# Patient Record
Sex: MALE | Race: WHITE | NOT HISPANIC OR LATINO | Employment: FULL TIME | URBAN - NONMETROPOLITAN AREA
[De-identification: names, ages, dates, MRNs, and addresses within clinical notes are randomized per-mention and may not be internally consistent; named-entity substitution may affect disease eponyms.]

---

## 2017-02-10 ENCOUNTER — TELEPHONE (OUTPATIENT)
Dept: INTERNAL MEDICINE | Facility: CLINIC | Age: 42
End: 2017-02-10

## 2017-02-10 NOTE — TELEPHONE ENCOUNTER
----- Message from China Alonzo sent at 2/10/2017  2:29 PM EST -----  Contact: Patients Wife   Patients spouse called the office requesting to see if Dr. Schafer could put him in a order to have his labs drawn to check his testosterone levels. She said that he has always seen [facility] and is unable to get off work. He was wanting to est with Dr. Schafer. He had an appointment scheduled for this evening but had to cancel due to not being able to get off work.She said that they have to have their labs drawn at [facility] due to insurance. She is wanting to see if there is any way someone could contact them before the weekend that he is almost out of medication.

## 2017-02-10 NOTE — TELEPHONE ENCOUNTER
I spoke with patients wife and informed her that the patient would need to be seen before labs could be ordered. The patient is scheduled to see mikel on Tuesday.

## 2017-02-14 ENCOUNTER — OFFICE VISIT (OUTPATIENT)
Dept: INTERNAL MEDICINE | Facility: CLINIC | Age: 42
End: 2017-02-14

## 2017-02-14 VITALS
HEART RATE: 87 BPM | HEIGHT: 76 IN | SYSTOLIC BLOOD PRESSURE: 136 MMHG | TEMPERATURE: 98.8 F | WEIGHT: 315 LBS | BODY MASS INDEX: 38.36 KG/M2 | DIASTOLIC BLOOD PRESSURE: 80 MMHG | OXYGEN SATURATION: 100 %

## 2017-02-14 DIAGNOSIS — R79.89 LOW TESTOSTERONE: Primary | ICD-10-CM

## 2017-02-14 DIAGNOSIS — J06.9 ACUTE URI: ICD-10-CM

## 2017-02-14 LAB
EXPIRATION DATE: NORMAL
FLUAV AG NPH QL: NEGATIVE
FLUBV AG NPH QL: NEGATIVE
INTERNAL CONTROL: NORMAL
Lab: NORMAL

## 2017-02-14 PROCEDURE — 99213 OFFICE O/P EST LOW 20 MIN: CPT | Performed by: PHYSICIAN ASSISTANT

## 2017-02-14 PROCEDURE — 87804 INFLUENZA ASSAY W/OPTIC: CPT | Performed by: PHYSICIAN ASSISTANT

## 2017-02-14 RX ORDER — TESTOSTERONE CYPIONATE 200 MG/ML
INJECTION, SOLUTION INTRAMUSCULAR
Qty: 3 ML | Refills: 5
Start: 2017-02-14 | End: 2017-10-16 | Stop reason: SDUPTHER

## 2017-02-14 RX ORDER — TESTOSTERONE CYPIONATE 200 MG/ML
INJECTION, SOLUTION INTRAMUSCULAR
Qty: 3 ML | Refills: 5 | Status: CANCELLED | OUTPATIENT
Start: 2017-02-14

## 2017-02-14 NOTE — PROGRESS NOTES
Cooper Luu is a 42 y.o. male.     Subjective   History of Present Illness   Here today for follow up of hypogonadism. Feels he is doing well with testosterone injections which he administers to himself every 10 days. Energy level has improved with injections. Low libido has improved since regulating testosterone. Denies erectile dysfunction. Denies decrease in urine stream or increase in nocturia. States he is about 7 days past due for his injection so testosterone level may be lower than when previously checked.     Today reports 3 days of cough, sore throat, body aches, headache and dizziness. Feels feverish at times but no reported fever. Has been exposed to the flu at work. Denies fever.        The following portions of the patient's history were reviewed and updated as appropriate: allergies, current medications, past family history, past medical history, past social history, past surgical history and problem list.    Review of Systems    Constitutional: Feeling feverish. Negative for appetite change, chills, fatigue and unexpected weight change.   HENT:  Congestion, postnasal drip, rhinorrhea, sore throat, cough.  Negative for ear pain, hearing loss, nosebleeds, tinnitus and trouble swallowing.    Eyes: Negative for photophobia, discharge and visual disturbance.   Respiratory: Negative for chest tightness, shortness of breath and wheezing.    Cardiovascular: Negative for chest pain, palpitations and leg swelling.   Gastrointestinal: Negative for abdominal distention, abdominal pain, blood in stool, constipation, diarrhea, nausea and vomiting.   Endocrine: Negative for cold intolerance, heat intolerance, polydipsia, polyphagia and polyuria.   Musculoskeletal: Negative for arthralgias, back pain, joint swelling, myalgias, neck pain and neck stiffness.   Skin: Negative for color change, pallor, rash and wound.   Allergic/Immunologic: Negative for environmental allergies, food allergies and immunocompromised  "state.   Neurological: Headaches, dizziness.  Negative for tremors, seizures, weakness, numbness  Hematological: Negative for adenopathy. Does not bruise/bleed easily.   Psychiatric/Behavioral: Negative for agitation, behavioral problems, confusion, hallucinations, self-injury and suicidal ideas. The patient is not nervous/anxious.      Objective    Physical Exam  Constitutional: Oriented to person, place, and time. Appears well-developed and well-nourished.   HENT: OP erythema. Right TM effusion.    Head: No sinus tenderness. Normocephalic and atraumatic.   Eyes: EOM are normal. Pupils are equal, round, and reactive to light.   Neck: Normal range of motion. Neck supple.   Cardiovascular: Normal rate, regular rhythm and normal heart sounds.    Pulmonary/Chest: Effort normal and breath sounds normal. No respiratory distress.  Has no wheezes or rales. Exhibits no chest wall tenderness.   Abdominal: Soft. Bowel sounds are normal. Exhibits no distension and no mass. There is no tenderness.   Musculoskeletal: Normal range of motion. Exhibits no tenderness.   Neurological: Alert and oriented to person, place, and time.   Skin: Skin is warm and dry.   Psychiatric: Has a normal mood and affect. Behavior is normal. Judgment and thought content normal.       Visit Vitals   • /80   • Pulse 87   • Temp 98.8 °F (37.1 °C)   • Ht 76\" (193 cm)   • Wt (!) 358 lb (162 kg)   • SpO2 100%   • BMI 43.58 kg/m2       Nursing note and vitals reviewed.        Assessment/Plan   Cooper was seen today for follow-up.    Diagnoses and all orders for this visit:    Low testosterone  -     Testosterone, Free, Total  -     Vitamin D 25 Hydroxy  -     Comprehensive Metabolic Panel    Acute URI      Influenza: negative A&B  Increase fluid intake. Supportive care. Call with worsened symptoms.       F/u 6 months.        "

## 2017-02-16 RX ORDER — AZITHROMYCIN 250 MG/1
TABLET, FILM COATED ORAL
Qty: 6 TABLET | Refills: 0 | Status: SHIPPED | OUTPATIENT
Start: 2017-02-16 | End: 2017-06-20

## 2017-02-20 RX ORDER — ERGOCALCIFEROL 1.25 MG/1
50000 CAPSULE ORAL WEEKLY
Qty: 4 CAPSULE | Refills: 2 | Status: SHIPPED | OUTPATIENT
Start: 2017-02-20 | End: 2017-03-22

## 2017-06-20 ENCOUNTER — OFFICE VISIT (OUTPATIENT)
Dept: INTERNAL MEDICINE | Facility: CLINIC | Age: 42
End: 2017-06-20

## 2017-06-20 VITALS
SYSTOLIC BLOOD PRESSURE: 130 MMHG | BODY MASS INDEX: 38.36 KG/M2 | HEIGHT: 76 IN | DIASTOLIC BLOOD PRESSURE: 86 MMHG | TEMPERATURE: 98.2 F | HEART RATE: 81 BPM | WEIGHT: 315 LBS | OXYGEN SATURATION: 99 %

## 2017-06-20 DIAGNOSIS — L30.1 DYSHIDROTIC ECZEMA: Primary | ICD-10-CM

## 2017-06-20 PROCEDURE — 99213 OFFICE O/P EST LOW 20 MIN: CPT | Performed by: PHYSICIAN ASSISTANT

## 2017-06-20 RX ORDER — ERGOCALCIFEROL 1.25 MG/1
CAPSULE ORAL
Refills: 0 | COMMUNITY
Start: 2017-04-16 | End: 2017-07-27 | Stop reason: SDUPTHER

## 2017-06-20 NOTE — PROGRESS NOTES
Cooper Luu is a 42 y.o. male.     Subjective   History of Present Illness   Here today with concern of an itchy rash on bilateral hands and the flexural surface of left elbow intermittently for the last year. Symptoms last a few weeks then resolve spontaneously.         The following portions of the patient's history were reviewed and updated as appropriate: allergies, current medications, past family history, past medical history, past social history, past surgical history and problem list.    Review of Systems    Constitutional: Negative for appetite change, chills, fatigue, fever and unexpected weight change.   HENT: Negative for congestion, ear pain, hearing loss, nosebleeds, postnasal drip, rhinorrhea, sore throat, tinnitus and trouble swallowing.    Eyes: Negative for photophobia, discharge and visual disturbance.   Respiratory: Negative for cough, chest tightness, shortness of breath and wheezing.    Cardiovascular: Negative for chest pain, palpitations and leg swelling.   Gastrointestinal: Negative for abdominal distention, abdominal pain, blood in stool, constipation, diarrhea, nausea and vomiting.   Endocrine: Negative for cold intolerance, heat intolerance, polydipsia, polyphagia and polyuria.   Musculoskeletal: Negative for arthralgias, back pain, joint swelling, myalgias, neck pain and neck stiffness.   Skin: pruritic rash bilateral hands. Negative for color change, pallor and wound.   Allergic/Immunologic: Negative for environmental allergies, food allergies and immunocompromised state.   Neurological: Negative for dizziness, tremors, seizures, weakness, numbness and headaches.   Hematological: Negative for adenopathy. Does not bruise/bleed easily.   Psychiatric/Behavioral: Negative for sleep disturbances, agitation, behavioral problems, confusion, hallucinations, self-injury and suicidal ideas. The patient is not nervous/anxious.      Objective    Physical Exam  Constitutional: Oriented to  "person, place, and time. Appears well-developed and well-nourished.   HENT:   Head: Normocephalic and atraumatic.   Eyes: EOM are normal. Pupils are equal, round, and reactive to light.   Neck: Normal range of motion. Neck supple.   Cardiovascular: Normal rate, regular rhythm and normal heart sounds.    Pulmonary/Chest: Effort normal and breath sounds normal. No respiratory distress.  Has no wheezes or rales. Exhibits no chest wall tenderness.   Abdominal: Soft. Bowel sounds are normal. Exhibits no distension and no mass. There is no tenderness.   Musculoskeletal: Normal range of motion. Exhibits no tenderness.   Neurological: Alert and oriented to person, place, and time.   Skin: pinpoint sized vesicles in webbing of fingers bilaterally and flexural surface of left elbow. Lichenification of the base of the right 3rd finger. Skin is warm and dry.   Psychiatric: Has a normal mood and affect. Behavior is normal. Judgment and thought content normal.       /86  Pulse 81  Temp 98.2 °F (36.8 °C)  Ht 76\" (193 cm)  Wt (!) 357 lb (162 kg)  SpO2 99%  BMI 43.46 kg/m2    Nursing note and vitals reviewed.        Assessment/Plan   Diagnoses and all orders for this visit:    Dyshidrotic eczema  -     triamcinolone (KENALOG) 0.1 % ointment; Apply a thin layer to effected areas twice daily during eczema flare ups.  Use for not more than 10 days at a time. Avoid face and groin.    Keep well moisturized between flare ups. Avoid aggressive scratching to prevent lichenification of areas.    F/u if symptoms persist or worsen.            "

## 2017-07-26 ENCOUNTER — TELEPHONE (OUTPATIENT)
Dept: INTERNAL MEDICINE | Facility: CLINIC | Age: 42
End: 2017-07-26

## 2017-07-26 NOTE — TELEPHONE ENCOUNTER
----- Message from Cooper Luu sent at 7/26/2017  8:41 AM EDT -----  Regarding: Referral Request  Contact: [phone]  Good morning. I had to go to the emergency room last night due to extreme pain in my right hip. The emergency room doctor said there is a bone spur on one of my vertebrae that is pressing on a nerve. He recommended that I see a neurosurgeon. Would mind referring me to Dr. Titus Romero ([phone]) please?

## 2017-07-27 ENCOUNTER — OFFICE VISIT (OUTPATIENT)
Dept: INTERNAL MEDICINE | Facility: CLINIC | Age: 42
End: 2017-07-27

## 2017-07-27 VITALS
DIASTOLIC BLOOD PRESSURE: 90 MMHG | HEART RATE: 91 BPM | SYSTOLIC BLOOD PRESSURE: 136 MMHG | WEIGHT: 315 LBS | BODY MASS INDEX: 38.36 KG/M2 | TEMPERATURE: 98.4 F | HEIGHT: 76 IN | OXYGEN SATURATION: 99 %

## 2017-07-27 DIAGNOSIS — M54.16 LUMBAR RADICULOPATHY, ACUTE: ICD-10-CM

## 2017-07-27 DIAGNOSIS — R10.31 SEVERE RIGHT GROIN PAIN: ICD-10-CM

## 2017-07-27 DIAGNOSIS — M48.061 LUMBAR STENOSIS: ICD-10-CM

## 2017-07-27 DIAGNOSIS — M25.551 RIGHT HIP PAIN: Primary | ICD-10-CM

## 2017-07-27 PROCEDURE — 99213 OFFICE O/P EST LOW 20 MIN: CPT | Performed by: PHYSICIAN ASSISTANT

## 2017-07-27 RX ORDER — OXYCODONE AND ACETAMINOPHEN 5; 325 MG/1; MG/1
TABLET ORAL
Refills: 0 | COMMUNITY
Start: 2017-07-26 | End: 2017-08-03

## 2017-07-27 RX ORDER — GABAPENTIN 300 MG/1
CAPSULE ORAL
Refills: 0 | COMMUNITY
Start: 2017-07-26 | End: 2017-08-03

## 2017-07-27 RX ORDER — ERGOCALCIFEROL 1.25 MG/1
50000 CAPSULE ORAL
Qty: 30 CAPSULE | Refills: 0 | Status: SHIPPED | OUTPATIENT
Start: 2017-07-27 | End: 2018-06-01

## 2017-07-27 NOTE — TELEPHONE ENCOUNTER
We cannot refer to a neurosurgeon without an MRI and he will need to be seen for an appointment to discuss first.

## 2017-07-27 NOTE — PROGRESS NOTES
Cooper Luu is a 42 y.o. male.     Subjective   History of Present Illness   Here today for follow up from [facility] ED visit on 7/25/2017 due to acute anterior right hip pain. CT scan showed lumbar canal stenosis. Nephrolithiasis and appendicitis were ruled out. Today he reports continued sharp pain in the anterior right hip and groin. Pain does not radiate. Pain worsens with lifting his leg even a small amount.  Feels weak in the right leg and has chronic numbness and restlessness in the right toes.  Initially pain was intermittent but progressed throughout the day until he could no longer tolerate the pain and was taken to the ER.  Was given percocet and gabapentin for pain which he takes every 6 hours for percocet and 8 hours for gabapentin. The combination eases the pain some but does not alleviate it. Denies loss of bowel or bladder function but has a little extra urine at the end of urination that he was not expecting. Denies low back pain.       The following portions of the patient's history were reviewed and updated as appropriate: allergies, current medications, past family history, past medical history, past social history, past surgical history and problem list.    Review of Systems    Constitutional: Negative for appetite change, chills, fatigue, fever and unexpected weight change.   HENT: Negative for congestion, ear pain, hearing loss, nosebleeds, postnasal drip, rhinorrhea, sore throat, tinnitus and trouble swallowing.    Eyes: Negative for photophobia, discharge and visual disturbance.   Respiratory: Negative for cough, chest tightness, shortness of breath and wheezing.    Cardiovascular: Negative for chest pain, palpitations and leg swelling.   Gastrointestinal: Negative for abdominal distention, abdominal pain, blood in stool, constipation, diarrhea, nausea and vomiting.   Endocrine: Negative for cold intolerance, heat intolerance, polydipsia, polyphagia and polyuria.   Musculoskeletal:  "right anterior hip pain, right groin pain.  Negative for back pain, joint swelling, myalgias, neck pain and neck stiffness.   Skin: Negative for color change, pallor, rash and wound.   Allergic/Immunologic: Negative for environmental allergies, food allergies and immunocompromised state.   Neurological: weakness, restless leg. Negative for dizziness, tremors, seizures, numbness and headaches.   Hematological: Negative for adenopathy. Does not bruise/bleed easily.   Psychiatric/Behavioral: Negative for sleep disturbances, agitation, behavioral problems, confusion, hallucinations, self-injury and suicidal ideas. The patient is not nervous/anxious.      Objective    Physical Exam  Constitutional: Oriented to person, place, and time. Appears well-developed and well-nourished.   HENT:   Head: Normocephalic and atraumatic.   Eyes: EOM are normal. Pupils are equal, round, and reactive to light.   Neck: Normal range of motion. Neck supple.   Cardiovascular: Normal rate, regular rhythm and normal heart sounds.    Pulmonary/Chest: Effort normal and breath sounds normal. No respiratory distress.  Has no wheezes or rales. Exhibits no chest wall tenderness.   Abdominal: Soft. Bowel sounds are normal. Exhibits no distension and no mass. There is no tenderness.   Musculoskeletal: unable to bear weight on right leg. Ambulating via wheelchair. Exam limited by pain.  Exhibits no tenderness to palpation of lumbar spine or right hip.   Neurological: Alert and oriented to person, place, and time.   Skin: Skin is warm and dry.   Psychiatric: Has a normal mood and affect. Behavior is normal. Judgment and thought content normal.       /90  Pulse 91  Temp 98.4 °F (36.9 °C)  Ht 76\" (193 cm)  Wt (!) 370 lb (168 kg)  SpO2 99%  BMI 45.04 kg/m2    Nursing note and vitals reviewed.        Assessment/Plan   Cooper was seen today for follow-up.    Diagnoses and all orders for this visit:    Right hip pain  -     MRI Lumbar Spine " Without Contrast    Severe right groin pain  -     MRI Lumbar Spine Without Contrast    Lumbar stenosis  -     MRI Lumbar Spine Without Contrast    Lumbar radiculopathy, acute  -     MRI Lumbar Spine Without Contrast        Refer to Dr. Titus Romero ([phone]) if neurosurgeon needed.

## 2017-07-28 RX ORDER — OXYCODONE HYDROCHLORIDE AND ACETAMINOPHEN 5; 325 MG/1; MG/1
1 TABLET ORAL EVERY 8 HOURS PRN
Qty: 30 TABLET | Refills: 0 | Status: SHIPPED | OUTPATIENT
Start: 2017-07-28 | End: 2017-08-03

## 2017-07-29 ENCOUNTER — HOSPITAL ENCOUNTER (EMERGENCY)
Facility: HOSPITAL | Age: 42
Discharge: HOME | End: 2017-07-29
Attending: EMERGENCY MEDICINE | Admitting: EMERGENCY MEDICINE

## 2017-07-29 VITALS
DIASTOLIC BLOOD PRESSURE: 94 MMHG | HEART RATE: 79 BPM | OXYGEN SATURATION: 97 % | RESPIRATION RATE: 20 BRPM | WEIGHT: 315 LBS | HEIGHT: 76 IN | TEMPERATURE: 98.1 F | SYSTOLIC BLOOD PRESSURE: 160 MMHG | BODY MASS INDEX: 38.36 KG/M2

## 2017-07-29 DIAGNOSIS — M54.41 ACUTE RIGHT-SIDED LOW BACK PAIN WITH RIGHT-SIDED SCIATICA: Primary | ICD-10-CM

## 2017-07-29 PROCEDURE — 99283 EMERGENCY DEPT VISIT LOW MDM: CPT

## 2017-07-31 ENCOUNTER — TELEPHONE (OUTPATIENT)
Dept: INTERNAL MEDICINE | Facility: CLINIC | Age: 42
End: 2017-07-31

## 2017-08-03 ENCOUNTER — OFFICE VISIT (OUTPATIENT)
Dept: INTERNAL MEDICINE | Facility: CLINIC | Age: 42
End: 2017-08-03

## 2017-08-03 ENCOUNTER — HOSPITAL ENCOUNTER (OUTPATIENT)
Dept: MRI IMAGING | Facility: HOSPITAL | Age: 42
Discharge: HOME | End: 2017-08-03
Admitting: PHYSICIAN ASSISTANT

## 2017-08-03 VITALS
HEIGHT: 76 IN | SYSTOLIC BLOOD PRESSURE: 140 MMHG | HEART RATE: 88 BPM | TEMPERATURE: 98.6 F | DIASTOLIC BLOOD PRESSURE: 91 MMHG | OXYGEN SATURATION: 98 % | BODY MASS INDEX: 38.36 KG/M2 | WEIGHT: 315 LBS

## 2017-08-03 DIAGNOSIS — M54.41 ACUTE RIGHT-SIDED LOW BACK PAIN WITH RIGHT-SIDED SCIATICA: Primary | ICD-10-CM

## 2017-08-03 PROCEDURE — 72148 MRI LUMBAR SPINE W/O DYE: CPT

## 2017-08-03 PROCEDURE — 99214 OFFICE O/P EST MOD 30 MIN: CPT | Performed by: INTERNAL MEDICINE

## 2017-08-03 RX ORDER — OXYCODONE AND ACETAMINOPHEN 7.5; 325 MG/1; MG/1
1 TABLET ORAL EVERY 8 HOURS PRN
Qty: 15 TABLET | Refills: 0 | Status: SHIPPED | OUTPATIENT
Start: 2017-08-03 | End: 2017-08-14

## 2017-08-03 RX ORDER — MELOXICAM 15 MG/1
TABLET ORAL
Refills: 0 | COMMUNITY
Start: 2017-08-02 | End: 2017-09-25

## 2017-08-03 NOTE — PROGRESS NOTES
Subjective  Cooper Luu is a 42 y.o. male    HPI patient coming in along with his wife after recent hospital discharge for acute right sided hip and groin pain. He appears in to himself to the emergency room in [place] with increasing pain after coming back from work. The pain was located in his right groin region without associated back pain he did not have fever or chills. ER lab work showed an elevated white count and a CT scan off his right hip region showing an irregularity of the cortex of the femoral neck suspicious for reactive inflammation or infectious process. He was sent home from the ER however one of the 4 blood cultures was positive and he was subsequently admitted to [facility] where he was evaluated by an orthopedic surgeon. He underwent more tests there and apparently an MRI did not show any abnormality with his right hip. He was not placed on antibiotics since it was thought that the culture was positive secondary to a contaminant.  He has had a history of back pain in the past with intervertebral disc disease requiring surgical correction. Continues to have pain but no fever or chills he is now on meloxicam 15 mg daily along with gabapentin and hydrocodone which gives him some relief he denies dysuria or hematuria    The following portions of the patient's history were reviewed and updated as appropriate: allergies, current medications, past family history, past medical history, past social history, past surgical history, and problem list.     Review of Systems   Constitutional: Negative.  Negative for activity change, appetite change, fatigue and fever.   HENT: Negative for congestion, ear discharge, ear pain and trouble swallowing.    Eyes: Negative for photophobia and visual disturbance.   Respiratory: Negative for cough and shortness of breath.    Cardiovascular: Negative for chest pain and palpitations.   Gastrointestinal: Negative for abdominal distention, abdominal pain,  constipation, diarrhea, nausea and vomiting.   Endocrine: Negative.    Genitourinary: Negative for dysuria, hematuria and urgency.   Musculoskeletal: Positive for back pain. Negative for arthralgias, joint swelling and myalgias.   Skin: Negative for color change and rash.   Allergic/Immunologic: Negative.    Neurological: Negative for dizziness, weakness, light-headedness and headaches.   Hematological: Negative for adenopathy. Does not bruise/bleed easily.   Psychiatric/Behavioral: Negative for agitation, confusion and dysphoric mood. The patient is not nervous/anxious.        Vitals:    08/03/17 1311   BP: 140/91   Pulse: 88   Temp: 98.6 °F (37 °C)   SpO2: 98%       Objective  Physical Exam   Constitutional: He is oriented to person, place, and time. He appears well-developed and well-nourished. No distress.   HENT:   Nose: Nose normal.   Mouth/Throat: Oropharynx is clear and moist.   Eyes: Conjunctivae and EOM are normal. No scleral icterus.   Neck: No tracheal deviation present. No thyromegaly present.   Cardiovascular: Normal rate and regular rhythm.  Exam reveals no friction rub.    No murmur heard.  Pulmonary/Chest: No respiratory distress. He has no wheezes. He has no rales.   Abdominal: Soft. He exhibits no distension and no mass. There is no tenderness. There is no guarding.   Musculoskeletal: Normal range of motion. He exhibits no deformity.   Lymphadenopathy:     He has no cervical adenopathy.   Neurological: He is alert and oriented to person, place, and time. He has normal reflexes. No cranial nerve deficit. Coordination normal.   Skin: Skin is warm and dry. No rash noted. No erythema.   Psychiatric: He has a normal mood and affect. His behavior is normal. Judgment and thought content normal.       Diagnoses and all orders for this visit:    Acute right-sided low back pain with right-sided sciatica ER records reviewed chest x-ray okay lab work showed an elevated white count renal function okay    Will  try and obtain records from [facility]. In the meantime suspect L1-L2 radiculopathy. Advise Percocet 7.5 325 every 8-12 hours when necessary discussed addictive nature of the medication continue with meloxicam gabapentin has been discontinued  Will set him up for an MRI of his LS-spine today  Other orders  -     meloxicam (MOBIC) 15 MG tablet; take 1 tablet by mouth once daily

## 2017-08-04 ENCOUNTER — HOSPITAL ENCOUNTER (OUTPATIENT)
Dept: MRI IMAGING | Facility: HOSPITAL | Age: 42
End: 2017-08-04

## 2017-08-04 ENCOUNTER — TELEPHONE (OUTPATIENT)
Dept: INTERNAL MEDICINE | Facility: CLINIC | Age: 42
End: 2017-08-04

## 2017-08-04 DIAGNOSIS — M54.41 ACUTE RIGHT-SIDED LOW BACK PAIN WITH RIGHT-SIDED SCIATICA: Primary | ICD-10-CM

## 2017-08-04 NOTE — TELEPHONE ENCOUNTER
----- Message from Cooper Luu sent at 8/3/2017  5:51 PM EDT -----  Regarding: Test Results Question  Contact: [phone]  Dr. Schafer, it was a bit hectic in my house when I spoke with you earlier regarding my MRI results, so you may have specified and I just missed it. I was hoping to clarify whether or not you were going to send the MRI results on to Dr. Romero now, or wait and see what my status was at the end of the six week rest period?

## 2017-08-07 ENCOUNTER — TELEPHONE (OUTPATIENT)
Dept: INTERNAL MEDICINE | Facility: CLINIC | Age: 42
End: 2017-08-07

## 2017-08-07 RX ORDER — HYDROCODONE BITARTRATE AND ACETAMINOPHEN 5; 325 MG/1; MG/1
1 TABLET ORAL EVERY 8 HOURS PRN
Qty: 30 TABLET | Refills: 0 | Status: SHIPPED | OUTPATIENT
Start: 2017-08-07 | End: 2017-09-25

## 2017-08-07 NOTE — TELEPHONE ENCOUNTER
----- Message from Cooper Luu sent at 8/6/2017  5:42 PM EDT -----  Regarding: Prescription Question  Contact: [phone]  Dr. Schafer,     I have been taking the Endocet 7.5-325mg before bed to help suppress the pain so I can sleep, and I have plenty of them remaining. However, the Endocet 5-325 that I take every 8 hours throughout the day will be out tomorrow (Monday) evening. Would it be possible to get a refill so that do not have to take the 7.5's during the day?    Thank You

## 2017-08-10 ENCOUNTER — TELEPHONE (OUTPATIENT)
Dept: INTERNAL MEDICINE | Facility: CLINIC | Age: 42
End: 2017-08-10

## 2017-08-14 ENCOUNTER — TELEPHONE (OUTPATIENT)
Dept: INTERNAL MEDICINE | Facility: CLINIC | Age: 42
End: 2017-08-14

## 2017-08-14 RX ORDER — OXYCODONE HYDROCHLORIDE AND ACETAMINOPHEN 5; 325 MG/1; MG/1
1 TABLET ORAL EVERY 6 HOURS PRN
Qty: 15 TABLET | Refills: 0 | Status: SHIPPED | OUTPATIENT
Start: 2017-08-14 | End: 2017-08-21 | Stop reason: SDUPTHER

## 2017-08-14 NOTE — TELEPHONE ENCOUNTER
----- Message from Cooper Luu sent at 8/13/2017  8:23 PM EDT -----  Regarding: RE: Prescription Question  Contact: [phone]  Alistair Mcrae,    I am having some unpleasant reactions to the Hydrocodon that was prescribed in place of the Oxycodone Dr. Schafer had originally prescribed me. I'm breaking out in rashes on my chest and am having a very difficult time sleeping.  Is there any way I can bring the Hydrocodon in and have it replaced with something else?     Thank You,  Cooper Luu  ----- Message -----  From: PAULO CASTELLANOS  Sent: 8/7/2017  4:23 PM EDT  To: Cooper Luu  Subject: RE: Prescription Question    Good Afternoon Mr. Luu,    I just wanted to inform you that your RX is ready for .  I will place this at the  for you.    If you have any further questions or concerns, please feel free to contact our office.    Thank you,  Clementina Lantigua [facility]    ----- Message -----     From: Cooper Luu     Sent: 8/6/2017  5:42 PM EDT       To: Edu Schafer MD  Subject: Prescription Question    Dr. Schafer,     I have been taking the Endocet 7.5-325mg before bed to help suppress the pain so I can sleep, and I have plenty of them remaining. However, the Endocet 5-325 that I take every 8 hours throughout the day will be out tomorrow (Monday) evening. Would it be possible to get a refill so that do not have to take the 7.5's during the day?    Thank You

## 2017-08-21 ENCOUNTER — TELEPHONE (OUTPATIENT)
Dept: INTERNAL MEDICINE | Facility: CLINIC | Age: 42
End: 2017-08-21

## 2017-08-21 RX ORDER — OXYCODONE HYDROCHLORIDE AND ACETAMINOPHEN 5; 325 MG/1; MG/1
1 TABLET ORAL EVERY 6 HOURS PRN
Qty: 20 TABLET | Refills: 0 | Status: SHIPPED | OUTPATIENT
Start: 2017-08-21 | End: 2017-08-31 | Stop reason: SDUPTHER

## 2017-08-21 NOTE — TELEPHONE ENCOUNTER
"----- Message from Cooper Luu sent at 8/20/2017  7:53 PM EDT -----  Regarding: RE: Prescription Question  Contact: [phone]  Good morning, I would like to request a refill of pain medicine please.  The previous prescription for Percocet 5-325 (Qty 15) has ran out.  The pain had been getting better, and I was not planning on requesting any more pain management medication.  However, after a trip to the grocery store with my wife where I picked up a case of bottled water without thinking, the pain is almost as bad as it originally was.  The right leg is also \"giving out\" on me at a more frequent rate.     Thank You,  Aleksandar Luu  ----- Message -----  From: PAULO CASTELLANOS  Sent: 8/14/2017  8:39 AM EDT  To: Cooper Luu  Subject: RE: Prescription Question    Good Morning Mr. Luu,    I just wanted to let you know that I have sent your message to Dr. Schafer.  I will let you know the outcome as soon as I get an answer from Dr. Schafer.    If you have any further questions or concerns, please feel free to contact our office.    Thank you,  Clementina Lantigua CMA    ----- Message -----     From: Cooper Luu     Sent: 8/13/2017  8:23 PM EDT       To: Edu Schafer MD  Subject: RE: Prescription Question    Alistair Mcrae,    I am having some unpleasant reactions to the Hydrocodon that was prescribed in place of the Oxycodone Dr. Schafer had originally prescribed me. I'm breaking out in rashes on my chest and am having a very difficult time sleeping.  Is there any way I can bring the Hydrocodon in and have it replaced with something else?     Thank You,  Cooper Luu  ----- Message -----  From: PAULO CASTELLANOS  Sent: 8/7/2017  4:23 PM EDT  To: Cooper Luu  Subject: RE: Prescription Question    Good Afternoon Mr. Luu,    I just wanted to inform you that your RX is ready for .  I will place this at the  for you.    If you have any further questions or concerns, please feel free to " contact our office.    Thank you,  Clementina Lantigua CMA    ----- Message -----     From: Cooper Luu     Sent: 8/6/2017  5:42 PM EDT       To: Edu Schafer MD  Subject: Prescription Question    Dr. Schafer,     I have been taking the Endocet 7.5-325mg before bed to help suppress the pain so I can sleep, and I have plenty of them remaining. However, the Endocet 5-325 that I take every 8 hours throughout the day will be out tomorrow (Monday) evening. Would it be possible to get a refill so that do not have to take the 7.5's during the day?    Thank You

## 2017-08-31 RX ORDER — OXYCODONE HYDROCHLORIDE AND ACETAMINOPHEN 5; 325 MG/1; MG/1
1 TABLET ORAL EVERY 8 HOURS PRN
Qty: 20 TABLET | Refills: 0 | Status: SHIPPED | OUTPATIENT
Start: 2017-08-31 | End: 2017-09-25

## 2017-09-25 ENCOUNTER — OFFICE VISIT (OUTPATIENT)
Dept: INTERNAL MEDICINE | Facility: CLINIC | Age: 42
End: 2017-09-25

## 2017-09-25 ENCOUNTER — TELEPHONE (OUTPATIENT)
Dept: INTERNAL MEDICINE | Facility: CLINIC | Age: 42
End: 2017-09-25

## 2017-09-25 VITALS
HEIGHT: 76 IN | WEIGHT: 315 LBS | BODY MASS INDEX: 38.36 KG/M2 | SYSTOLIC BLOOD PRESSURE: 146 MMHG | TEMPERATURE: 98.4 F | HEART RATE: 88 BPM | OXYGEN SATURATION: 99 % | DIASTOLIC BLOOD PRESSURE: 94 MMHG

## 2017-09-25 DIAGNOSIS — L70.0 CYSTIC ACNE: ICD-10-CM

## 2017-09-25 DIAGNOSIS — E55.9 VITAMIN D DEFICIENCY: ICD-10-CM

## 2017-09-25 DIAGNOSIS — Z00.00 PREVENTATIVE HEALTH CARE: ICD-10-CM

## 2017-09-25 DIAGNOSIS — F17.200 CURRENT SMOKER: ICD-10-CM

## 2017-09-25 DIAGNOSIS — R73.01 ELEVATED FASTING GLUCOSE: ICD-10-CM

## 2017-09-25 DIAGNOSIS — F41.9 ANXIETY: ICD-10-CM

## 2017-09-25 DIAGNOSIS — R79.89 LOW TESTOSTERONE: Primary | ICD-10-CM

## 2017-09-25 PROCEDURE — 99406 BEHAV CHNG SMOKING 3-10 MIN: CPT | Performed by: PHYSICIAN ASSISTANT

## 2017-09-25 PROCEDURE — 99214 OFFICE O/P EST MOD 30 MIN: CPT | Performed by: PHYSICIAN ASSISTANT

## 2017-09-25 RX ORDER — FLUOXETINE 10 MG/1
10 CAPSULE ORAL DAILY
Qty: 30 CAPSULE | Refills: 5 | Status: SHIPPED | OUTPATIENT
Start: 2017-09-25 | End: 2017-12-18

## 2017-09-25 NOTE — PROGRESS NOTES
Cooper Luu is a 42 y.o. male.     Subjective   History of Present Illness   Hypogonadism- Last injection about 20-25 days ago.  Ran out and was unable to get in for an office visit prior to today.  Can tell when his testosterone is low because he becomes fatigued and emotional.  Interested in seeing a urologist to try pellet administration.     Acne-  Concerned with acne on the arms and chest.     Anxiety- every 2 weeks or so he gets overwhelmed with anxiety at work and takes Xanax 0.25 mg which helps. Has had a prescription of 10 tablets which has lasted him about 1 year and he is requesting a refill. Has used Prozac in the past which caused some sexual side effects but helped his anxiety overall. Not opposed to trying again. Denies SI or HI.     Interested in quitting smoking. Has had some success with Chantix in the past and would like to retry it.       The following portions of the patient's history were reviewed and updated as appropriate: allergies, current medications, past family history, past medical history, past social history, past surgical history and problem list.    Review of Systems    Constitutional: fatigue. Negative for appetite change, chills, fever and unexpected weight change.   HENT: Negative for congestion, ear pain, hearing loss, nosebleeds, postnasal drip, rhinorrhea, sore throat, tinnitus and trouble swallowing.    Eyes: Negative for photophobia, discharge and visual disturbance.   Respiratory: Negative for cough, chest tightness, shortness of breath and wheezing.    Cardiovascular: Negative for chest pain, palpitations and leg swelling.   Gastrointestinal: Negative for abdominal distention, abdominal pain, blood in stool, constipation, diarrhea, nausea and vomiting.   Endocrine: Negative for cold intolerance, heat intolerance, polydipsia, polyphagia and polyuria.   Musculoskeletal: Negative for arthralgias, back pain, joint swelling, myalgias, neck pain and neck stiffness.   Skin:  "acne. Negative for color change, pallor, rash and wound.   Allergic/Immunologic: Negative for environmental allergies, food allergies and immunocompromised state.   Neurological: Negative for dizziness, tremors, seizures, weakness, numbness and headaches.   Hematological: Negative for adenopathy. Does not bruise/bleed easily.   Psychiatric/Behavioral: emotional, anxiety. Negative for sleep disturbances, agitation, behavioral problems, confusion, hallucinations, self-injury and suicidal ideas.       Objective    Physical Exam  Constitutional: Oriented to person, place, and time. Appears well-developed and well-nourished.   HENT:   Head: Normocephalic and atraumatic.   Eyes: EOM are normal. Pupils are equal, round, and reactive to light.   Neck: Normal range of motion. Neck supple.   Cardiovascular: Normal rate, regular rhythm and normal heart sounds.    Pulmonary/Chest: Effort normal and breath sounds normal. No respiratory distress.  Has no wheezes or rales. Exhibits no chest wall tenderness.   Abdominal: Soft. Bowel sounds are normal. Exhibits no distension and no mass. There is no tenderness.   Musculoskeletal: Normal range of motion. Exhibits no tenderness.   Neurological: Alert and oriented to person, place, and time.   Skin: acne of arms and chest. Skin is warm and dry.   Psychiatric: Has a normal mood and affect. Behavior is normal. Judgment and thought content normal.       /94  Pulse 88  Temp 98.4 °F (36.9 °C)  Ht 76\" (193 cm)  Wt (!) 364 lb (165 kg)  SpO2 99%  BMI 44.31 kg/m2    Nursing note and vitals reviewed.      Assessment/Plan   Cooper was seen today for follow-up and acne.    Diagnoses and all orders for this visit:    Low testosterone  -     Testosterone  -     Sex Horm Binding Globulin  -     Comprehensive Metabolic Panel  -     Ambulatory Referral to Urology    Cystic acne  Discussed  That this is a side effect of testosterone therapy.     Anxiety  -     Begin: FLUoxetine (PROZAC) 10 " MG capsule; Take 1 capsule by mouth Daily.    Current smoker  -     Begin: varenicline (CHANTIX STARTING MONTH DAVID) 0.5 MG X 11 & 1 MG X 42 tablet; Take 0.5 mg one daily on days 1-2 and 0.5 mg twice daily on days 4-7. Then 1 mg twice daily for a total of 12 weeks.  -     Lipid Panel  Spent > 5 minutes discussing smoking cessation.     Vitamin D deficiency  -     Vitamin D 25 Hydroxy    [facility] care  -     Lipid Panel    Elevated fasting glucose  -     Hemoglobin A1c      Fasting labs in 1 month after 3 regularly timed injections.

## 2017-09-28 DIAGNOSIS — F17.200 CURRENT SMOKER: ICD-10-CM

## 2017-10-14 LAB
25(OH)D3+25(OH)D2 SERPL-MCNC: 16.8 NG/ML
ALBUMIN SERPL-MCNC: 4.2 G/DL (ref 3.5–5)
ALBUMIN/GLOB SERPL: 1.4 G/DL (ref 1–2)
ALP SERPL-CCNC: 67 U/L (ref 38–126)
ALT SERPL-CCNC: 75 U/L (ref 13–69)
AST SERPL-CCNC: 51 U/L (ref 15–46)
BILIRUB SERPL-MCNC: 0.7 MG/DL (ref 0.2–1.3)
BUN SERPL-MCNC: 9 MG/DL (ref 7–20)
BUN/CREAT SERPL: 10 (ref 6.3–21.9)
CALCIUM SERPL-MCNC: 9.3 MG/DL (ref 8.4–10.2)
CHLORIDE SERPL-SCNC: 105 MMOL/L (ref 98–107)
CHOLEST SERPL-MCNC: 167 MG/DL (ref 0–199)
CO2 SERPL-SCNC: 25 MMOL/L (ref 26–30)
CREAT SERPL-MCNC: 0.9 MG/DL (ref 0.6–1.3)
GLOBULIN SER CALC-MCNC: 3.1 GM/DL
GLUCOSE SERPL-MCNC: 107 MG/DL (ref 74–98)
HBA1C MFR BLD: 5.4 %
HDLC SERPL-MCNC: 24 MG/DL (ref 40–60)
LDLC SERPL CALC-MCNC: 90 MG/DL (ref 0–99)
POTASSIUM SERPL-SCNC: 4.5 MMOL/L (ref 3.5–5.1)
PROT SERPL-MCNC: 7.3 G/DL (ref 6.3–8.2)
SHBG SERPL-SCNC: 13.8 NMOL/L (ref 16.5–55.9)
SODIUM SERPL-SCNC: 142 MMOL/L (ref 137–145)
TESTOST SERPL-MCNC: 566 NG/DL (ref 264–916)
TRIGL SERPL-MCNC: 266 MG/DL
VLDLC SERPL CALC-MCNC: 53.2 MG/DL

## 2017-10-16 DIAGNOSIS — R79.89 LOW TESTOSTERONE: Primary | ICD-10-CM

## 2017-10-16 RX ORDER — TESTOSTERONE CYPIONATE 200 MG/ML
INJECTION, SOLUTION INTRAMUSCULAR
Qty: 3 ML | Refills: 5 | OUTPATIENT
Start: 2017-10-16 | End: 2018-05-24 | Stop reason: SDUPTHER

## 2017-10-16 RX ORDER — SYRINGE WITH NEEDLE, 1 ML 25GX5/8"
SYRINGE, EMPTY DISPOSABLE MISCELLANEOUS
Qty: 50 EACH | Refills: 0 | Status: SHIPPED | OUTPATIENT
Start: 2017-10-16 | End: 2018-12-11 | Stop reason: SDUPTHER

## 2017-12-06 ENCOUNTER — TELEPHONE (OUTPATIENT)
Dept: INTERNAL MEDICINE | Facility: CLINIC | Age: 42
End: 2017-12-06

## 2017-12-06 NOTE — TELEPHONE ENCOUNTER
----- Message from Cooper Luu sent at 12/6/2017  3:53 PM EST -----  Regarding: Prescription Question  Contact: [phone]  I am experiencing tremendous muscle pain in my lower back again. I will be scheduling an appointment as soon as possible, but would respectfully like to request some type of muscle relaxer or similar for the pain until I am able to come in for the appointment if possible please. Thank you.

## 2017-12-07 RX ORDER — BACLOFEN 10 MG/1
10 TABLET ORAL 3 TIMES DAILY PRN
Qty: 60 TABLET | Refills: 1 | Status: SHIPPED | OUTPATIENT
Start: 2017-12-07 | End: 2019-05-03

## 2017-12-18 ENCOUNTER — OFFICE VISIT (OUTPATIENT)
Dept: INTERNAL MEDICINE | Facility: CLINIC | Age: 42
End: 2017-12-18

## 2017-12-18 VITALS
TEMPERATURE: 97.7 F | OXYGEN SATURATION: 97 % | SYSTOLIC BLOOD PRESSURE: 138 MMHG | HEIGHT: 76 IN | HEART RATE: 90 BPM | WEIGHT: 315 LBS | BODY MASS INDEX: 38.36 KG/M2 | DIASTOLIC BLOOD PRESSURE: 98 MMHG

## 2017-12-18 DIAGNOSIS — F07.81 POST CONCUSSION SYNDROME: Primary | ICD-10-CM

## 2017-12-18 PROCEDURE — 99213 OFFICE O/P EST LOW 20 MIN: CPT | Performed by: PHYSICIAN ASSISTANT
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
## 2018-01-30 ENCOUNTER — OFFICE VISIT (OUTPATIENT)
Dept: INTERNAL MEDICINE | Facility: CLINIC | Age: 43
End: 2018-01-30

## 2018-01-30 VITALS
TEMPERATURE: 98.5 F | SYSTOLIC BLOOD PRESSURE: 126 MMHG | DIASTOLIC BLOOD PRESSURE: 82 MMHG | OXYGEN SATURATION: 100 % | BODY MASS INDEX: 38.36 KG/M2 | HEIGHT: 76 IN | WEIGHT: 315 LBS | HEART RATE: 78 BPM

## 2018-01-30 DIAGNOSIS — R41.840 CONCENTRATION DEFICIT: Primary | ICD-10-CM

## 2018-01-30 PROCEDURE — 99213 OFFICE O/P EST LOW 20 MIN: CPT | Performed by: PHYSICIAN ASSISTANT

## 2018-01-30 RX ORDER — SYRINGE WITH NEEDLE, 1 ML 25GX5/8"
SYRINGE, EMPTY DISPOSABLE MISCELLANEOUS
Refills: 0 | COMMUNITY
Start: 2017-11-18 | End: 2018-05-24 | Stop reason: SDUPTHER

## 2018-01-30 NOTE — PROGRESS NOTES
Cooper Luu is a 43 y.o. male.     Subjective   History of Present Illness   Here today with concern of trouble concentrating and trouble with recall. He recently started grad school online and noticed he can read a paragraph and then not remember what he has read. He has also noticed he is struggling to remember conversations and getting organized at work and home. He is not interested in a stimulant use.         The following portions of the patient's history were reviewed and updated as appropriate: allergies, current medications, past family history, past medical history, past social history, past surgical history and problem list.    Review of Systems    Constitutional: Negative for appetite change, chills, fatigue, fever and unexpected weight change.   HENT: Negative for congestion, ear pain, hearing loss, nosebleeds, postnasal drip, rhinorrhea, sore throat, tinnitus and trouble swallowing.    Eyes: Negative for photophobia, discharge and visual disturbance.   Respiratory: Negative for cough, chest tightness, shortness of breath and wheezing.    Cardiovascular: Negative for chest pain, palpitations and leg swelling.   Gastrointestinal: Negative for abdominal distention, abdominal pain, blood in stool, constipation, diarrhea, nausea and vomiting.   Endocrine: Negative for cold intolerance, heat intolerance, polydipsia, polyphagia and polyuria.   Musculoskeletal: Negative for arthralgias, back pain, joint swelling, myalgias, neck pain and neck stiffness.   Skin: Negative for color change, pallor, rash and wound.   Allergic/Immunologic: Negative for environmental allergies, food allergies and immunocompromised state.   Neurological: Negative for dizziness, tremors, seizures, weakness, numbness and headaches.   Hematological: Negative for adenopathy. Does not bruise/bleed easily.   Psychiatric/Behavioral: trouble concentrating, inattentive.  Negative for sleep disturbances, agitation, behavioral problems,  "confusion, hallucinations, self-injury and suicidal ideas. The patient is not nervous/anxious.      Objective    Physical Exam  Constitutional: Oriented to person, place, and time. Appears well-developed and well-nourished.   HENT:   Head: Normocephalic and atraumatic.   Eyes: EOM are normal. Pupils are equal, round, and reactive to light.   Neck: Normal range of motion. Neck supple.   Cardiovascular: Normal rate, regular rhythm and normal heart sounds.    Pulmonary/Chest: Effort normal and breath sounds normal. No respiratory distress.  Has no wheezes or rales. Exhibits no chest wall tenderness.   Abdominal: Soft. Bowel sounds are normal. Exhibits no distension and no mass. There is no tenderness.   Musculoskeletal: Normal range of motion. Exhibits no tenderness.   Neurological: Alert and oriented to person, place, and time.   Skin: Skin is warm and dry.   Psychiatric: Has a normal mood and affect. Behavior is normal. Judgment and thought content normal.       /82  Pulse 78  Temp 98.5 °F (36.9 °C)  Ht 193 cm (75.98\")  Wt (!) 162 kg (357 lb)  SpO2 100%  BMI 43.47 kg/m2    Nursing note and vitals reviewed.        Assessment/Plan   Cooper was seen today for trouble with memory and concentration.    Diagnoses and all orders for this visit:    Concentration deficit    Encouraged him to begin gingko biloba supplement to improve symptoms.     If desired , he will be referred to psych for formal evaluation of adult ADHD at a later date.      "

## 2018-05-24 ENCOUNTER — OFFICE VISIT (OUTPATIENT)
Dept: INTERNAL MEDICINE | Facility: CLINIC | Age: 43
End: 2018-05-24

## 2018-05-24 VITALS
HEIGHT: 76 IN | TEMPERATURE: 97.3 F | HEART RATE: 83 BPM | WEIGHT: 315 LBS | BODY MASS INDEX: 38.36 KG/M2 | SYSTOLIC BLOOD PRESSURE: 136 MMHG | OXYGEN SATURATION: 98 % | DIASTOLIC BLOOD PRESSURE: 78 MMHG

## 2018-05-24 DIAGNOSIS — E55.9 VITAMIN D DEFICIENCY: ICD-10-CM

## 2018-05-24 DIAGNOSIS — Z79.890 ENCOUNTER FOR MONITORING TESTOSTERONE REPLACEMENT THERAPY: ICD-10-CM

## 2018-05-24 DIAGNOSIS — R79.89 LOW TESTOSTERONE: Primary | ICD-10-CM

## 2018-05-24 DIAGNOSIS — Z51.81 ENCOUNTER FOR MONITORING TESTOSTERONE REPLACEMENT THERAPY: ICD-10-CM

## 2018-05-24 DIAGNOSIS — Z12.5 SCREENING PSA (PROSTATE SPECIFIC ANTIGEN): ICD-10-CM

## 2018-05-24 DIAGNOSIS — R79.89 LOW TESTOSTERONE: ICD-10-CM

## 2018-05-24 PROCEDURE — 99213 OFFICE O/P EST LOW 20 MIN: CPT | Performed by: PHYSICIAN ASSISTANT

## 2018-05-24 RX ORDER — TESTOSTERONE CYPIONATE 200 MG/ML
INJECTION, SOLUTION INTRAMUSCULAR
Qty: 3 ML | Refills: 5 | Status: SHIPPED | OUTPATIENT
Start: 2018-05-24 | End: 2018-12-13 | Stop reason: SDUPTHER

## 2018-05-24 RX ORDER — SYRINGE WITH NEEDLE, 1 ML 25GX5/8"
SYRINGE, EMPTY DISPOSABLE MISCELLANEOUS
Qty: 100 EACH | Refills: 2 | Status: SHIPPED | OUTPATIENT
Start: 2018-05-24 | End: 2018-12-11 | Stop reason: SDUPTHER

## 2018-05-24 NOTE — PROGRESS NOTES
Cooper Luu is a 43 y.o. male.     Subjective   History of Present Illness   Here today for follow up of hypogonadism. He has been administering testosterone to himself every 10 days with the last injection on 5/20.  He reports he is feeling well overall and no longer feels as fatigued. He continues to smoke but is interested in quitting.           The following portions of the patient's history were reviewed and updated as appropriate: allergies, current medications, past family history, past medical history, past social history, past surgical history and problem list.    Review of Systems    Constitutional: fatigue. Negative for appetite change, chills, fever and unexpected weight change.   HENT: Negative for congestion, ear pain, hearing loss, nosebleeds, postnasal drip, rhinorrhea, sore throat, tinnitus and trouble swallowing.    Eyes: Negative for photophobia, discharge and visual disturbance.   Respiratory: Negative for cough, chest tightness, shortness of breath and wheezing.    Cardiovascular: Negative for chest pain, palpitations and leg swelling.   Gastrointestinal: Negative for abdominal distention, abdominal pain, blood in stool, constipation, diarrhea, nausea and vomiting.   Endocrine: Negative for cold intolerance, heat intolerance, polydipsia, polyphagia and polyuria.   Musculoskeletal: Negative for arthralgias, back pain, joint swelling, myalgias, neck pain and neck stiffness.   Skin: Negative for color change, pallor, rash and wound.   Allergic/Immunologic: Negative for environmental allergies, food allergies and immunocompromised state.   Neurological: Negative for dizziness, tremors, seizures, weakness, numbness and headaches.   Hematological: Negative for adenopathy. Does not bruise/bleed easily.   Psychiatric/Behavioral: Negative for sleep disturbances, agitation, behavioral problems, confusion, hallucinations, self-injury and suicidal ideas. The patient is not nervous/anxious.   "    Objective    Physical Exam  Constitutional: Oriented to person, place, and time. Appears well-developed and well-nourished.   HENT: OP normal.   Head: Normocephalic and atraumatic.   Eyes: EOM are normal. Pupils are equal, round, and reactive to light.   Neck: Normal range of motion. Neck supple.   Cardiovascular: Normal rate, regular rhythm and normal heart sounds.    Pulmonary/Chest: Effort normal and breath sounds normal. No respiratory distress.  Has no wheezes or rales. Exhibits no chest wall tenderness.   Abdominal: Soft. Bowel sounds are normal. Exhibits no distension and no mass. There is no tenderness.   Musculoskeletal: Normal range of motion. Exhibits no tenderness.   Neurological: Alert and oriented to person, place, and time.   Skin: Skin is warm and dry.   Psychiatric: Has a normal mood and affect. Behavior is normal. Judgment and thought content normal.       /78   Pulse 83   Temp 97.3 °F (36.3 °C)   Ht 193 cm (75.98\")   Wt (!) 159 kg (350 lb)   SpO2 98%   BMI 42.63 kg/m²     Nursing note and vitals reviewed.        Assessment/Plan   Cooper was seen today for follow-up.    Diagnoses and all orders for this visit:    Low testosterone  -     B-D 3CC LUER-CAROL SYR 79HE4-9/2 18G X 1-1/2\" 3 ML misc; Use every 10 days to draw up testosterone for injection.  -     Needle, Disp, (BD DISP NEEDLES) 22G X 1-1/2\" misc; Use 1 every 10 days to administer testosterone.  -     Testosterone  -     Comprehensive Metabolic Panel  -     CBC (No Diff)    Vitamin D deficiency  -     Vitamin D 25 Hydroxy    Encounter for monitoring testosterone replacement therapy  -     Testosterone  -     Comprehensive Metabolic Panel  -     CBC (No Diff)    Screening PSA (prostate specific antigen)  -     PSA Screen               "

## 2018-05-25 LAB
25(OH)D3+25(OH)D2 SERPL-MCNC: 28.9 NG/ML
ALBUMIN SERPL-MCNC: 4 G/DL (ref 3.5–5)
ALBUMIN/GLOB SERPL: 1.3 G/DL (ref 1–2)
ALP SERPL-CCNC: 69 U/L (ref 38–126)
ALT SERPL-CCNC: 68 U/L (ref 13–69)
AST SERPL-CCNC: 42 U/L (ref 15–46)
BILIRUB SERPL-MCNC: 0.7 MG/DL (ref 0.2–1.3)
BUN SERPL-MCNC: 7 MG/DL (ref 7–20)
BUN/CREAT SERPL: 8.8 (ref 6.3–21.9)
CALCIUM SERPL-MCNC: 9.2 MG/DL (ref 8.4–10.2)
CHLORIDE SERPL-SCNC: 101 MMOL/L (ref 98–107)
CO2 SERPL-SCNC: 27 MMOL/L (ref 26–30)
CREAT SERPL-MCNC: 0.8 MG/DL (ref 0.6–1.3)
ERYTHROCYTE [DISTWIDTH] IN BLOOD BY AUTOMATED COUNT: 12.7 % (ref 11.5–14.5)
GFR SERPLBLD CREATININE-BSD FMLA CKD-EPI: 107 ML/MIN/1.73
GFR SERPLBLD CREATININE-BSD FMLA CKD-EPI: 129 ML/MIN/1.73
GLOBULIN SER CALC-MCNC: 3.2 GM/DL
GLUCOSE SERPL-MCNC: 89 MG/DL (ref 74–98)
HCT VFR BLD AUTO: 45.9 % (ref 42–52)
HGB BLD-MCNC: 16.3 G/DL (ref 14–18)
MCH RBC QN AUTO: 30.6 PG (ref 27–31)
MCHC RBC AUTO-ENTMCNC: 35.5 G/DL (ref 30–37)
MCV RBC AUTO: 86.3 FL (ref 80–94)
PLATELET # BLD AUTO: 278 10*3/MM3 (ref 130–400)
POTASSIUM SERPL-SCNC: 3.8 MMOL/L (ref 3.5–5.1)
PROT SERPL-MCNC: 7.2 G/DL (ref 6.3–8.2)
PSA SERPL-MCNC: 0.5 NG/ML (ref 0.06–4)
RBC # BLD AUTO: 5.32 10*6/MM3 (ref 4.7–6.1)
SODIUM SERPL-SCNC: 141 MMOL/L (ref 137–145)
TESTOST SERPL-MCNC: 627 NG/DL (ref 264–916)
WBC # BLD AUTO: 8.32 10*3/MM3 (ref 4.8–10.8)

## 2018-05-29 ENCOUNTER — TELEPHONE (OUTPATIENT)
Dept: INTERNAL MEDICINE | Facility: CLINIC | Age: 43
End: 2018-05-29

## 2018-06-01 ENCOUNTER — PROCEDURE VISIT (OUTPATIENT)
Dept: INTERNAL MEDICINE | Facility: CLINIC | Age: 43
End: 2018-06-01

## 2018-06-01 VITALS
SYSTOLIC BLOOD PRESSURE: 110 MMHG | DIASTOLIC BLOOD PRESSURE: 80 MMHG | OXYGEN SATURATION: 100 % | BODY MASS INDEX: 38.36 KG/M2 | HEART RATE: 92 BPM | WEIGHT: 315 LBS | TEMPERATURE: 97.8 F | HEIGHT: 76 IN

## 2018-06-01 DIAGNOSIS — L02.212 ABSCESS OF BACK: Primary | ICD-10-CM

## 2018-06-01 PROCEDURE — 99213 OFFICE O/P EST LOW 20 MIN: CPT | Performed by: INTERNAL MEDICINE

## 2018-06-01 PROCEDURE — 10060 I&D ABSCESS SIMPLE/SINGLE: CPT | Performed by: INTERNAL MEDICINE

## 2018-06-01 RX ORDER — DOXYCYCLINE HYCLATE 100 MG/1
100 TABLET, DELAYED RELEASE ORAL 2 TIMES DAILY
Qty: 20 TABLET | Refills: 0 | Status: SHIPPED | OUTPATIENT
Start: 2018-06-01 | End: 2018-12-11

## 2018-06-01 NOTE — PROGRESS NOTES
"Subjective  Cooper Luu is a 43 y.o. male    HPI coming in for possible incision and drainage of a boil he has on his right lower back. Complains of severe pain. He says he has had history of MRSA infection in the past. He denies fever or chills has hypogonadism for which he is on testosterone    The following portions of the patient's history were reviewed and updated as appropriate: allergies, current medications, past family history, past medical history, past social history, past surgical history, and problem list.     Review of Systems   Constitutional: Negative.  Negative for activity change, appetite change, fatigue and fever.   HENT: Negative for congestion, ear discharge, ear pain and trouble swallowing.    Eyes: Negative for photophobia and visual disturbance.   Respiratory: Negative for cough and shortness of breath.    Cardiovascular: Negative for chest pain and palpitations.   Gastrointestinal: Negative for abdominal distention, abdominal pain, constipation, diarrhea, nausea and vomiting.   Endocrine: Negative.    Genitourinary: Negative for dysuria, hematuria and urgency.   Musculoskeletal: Negative for arthralgias, back pain, joint swelling and myalgias.   Skin: Negative for color change and rash.        Abscess on back   Allergic/Immunologic: Negative.    Neurological: Negative for dizziness, weakness, light-headedness and headaches.   Hematological: Negative for adenopathy. Does not bruise/bleed easily.   Psychiatric/Behavioral: Negative for agitation, confusion and dysphoric mood. The patient is not nervous/anxious.        Visit Vitals  /80   Pulse 92   Temp 97.8 °F (36.6 °C)   Ht 193 cm (75.98\")   Wt (!) 158 kg (349 lb)   SpO2 100%   BMI 42.50 kg/m²       Objective  Physical Exam   Constitutional: He is oriented to person, place, and time. He appears well-developed and well-nourished. No distress.   HENT:   Nose: Nose normal.   Mouth/Throat: Oropharynx is clear and moist.   Eyes: " Conjunctivae and EOM are normal. No scleral icterus.   Neck: No tracheal deviation present. No thyromegaly present.   Cardiovascular: Normal rate and regular rhythm.  Exam reveals no friction rub.    No murmur heard.  Pulmonary/Chest: No respiratory distress. He has no wheezes. He has no rales.   Abdominal: Soft. He exhibits no distension and no mass. There is no tenderness. There is no guarding.   Musculoskeletal: Normal range of motion. He exhibits no deformity.   Lymphadenopathy:     He has no cervical adenopathy.   Neurological: He is alert and oriented to person, place, and time. He has normal reflexes. No cranial nerve deficit. Coordination normal.   Skin: Skin is warm and dry. No rash noted. No erythema.   Psychiatric: He has a normal mood and affect. His behavior is normal. Judgment and thought content normal.       Diagnoses and all orders for this visit:    Abscess of back  Procedure verbal consent obtained for incision and drainage. Area cleaned with Betadine and alcohol. Local anesthesia with 1% lidocaine. Using a scalpel and then artery forceps purulent drainage extruded. Sent off for culture and Gram stain. Hemostasis achieved. Adequate dressing applied. Patient tolerated the procedure well.  Discussed Hibiclens wash, Bactroban cream. Along with nasal swabs. Doxycycline for 7 days orally  -     Anaerobic & Aerobic Culture ([facility] Only) - Swab, Hip, Right    Other orders  -     doxycycline (DORYX) 100 MG enteric coated tablet; Take 1 tablet by mouth 2 (Two) Times a Day.  -     mupirocin (BACTROBAN) 2 % ointment; Apply  topically 3 (Three) Times a Day.

## 2018-06-06 LAB
BACTERIA SPEC AEROBE CULT: ABNORMAL
BACTERIA SPEC ANAEROBE CULT: ABNORMAL
BACTERIA SPEC CULT: ABNORMAL
BACTERIA SPEC CULT: ABNORMAL
OTHER ANTIBIOTIC SUSC ISLT: ABNORMAL

## 2018-10-01 RX ORDER — BUSPIRONE HYDROCHLORIDE 10 MG/1
10 TABLET ORAL 3 TIMES DAILY PRN
Qty: 90 TABLET | Refills: 5 | Status: SHIPPED | OUTPATIENT
Start: 2018-10-01 | End: 2020-09-25 | Stop reason: SDUPTHER

## 2018-11-26 ENCOUNTER — PATIENT MESSAGE (OUTPATIENT)
Dept: INTERNAL MEDICINE | Facility: CLINIC | Age: 43
End: 2018-11-26

## 2018-12-11 ENCOUNTER — OFFICE VISIT (OUTPATIENT)
Dept: INTERNAL MEDICINE | Facility: CLINIC | Age: 43
End: 2018-12-11

## 2018-12-11 VITALS
HEIGHT: 76 IN | WEIGHT: 315 LBS | HEART RATE: 95 BPM | SYSTOLIC BLOOD PRESSURE: 136 MMHG | DIASTOLIC BLOOD PRESSURE: 84 MMHG | OXYGEN SATURATION: 99 % | TEMPERATURE: 98.7 F | BODY MASS INDEX: 38.36 KG/M2

## 2018-12-11 DIAGNOSIS — R63.5 WEIGHT GAIN: Primary | ICD-10-CM

## 2018-12-11 DIAGNOSIS — F32.0 CURRENT MILD EPISODE OF MAJOR DEPRESSIVE DISORDER WITHOUT PRIOR EPISODE (HCC): ICD-10-CM

## 2018-12-11 DIAGNOSIS — R68.82 LOW LIBIDO: ICD-10-CM

## 2018-12-11 DIAGNOSIS — R79.89 LOW TESTOSTERONE: ICD-10-CM

## 2018-12-11 DIAGNOSIS — L30.9 ECZEMA, UNSPECIFIED TYPE: ICD-10-CM

## 2018-12-11 DIAGNOSIS — Z23 NEED FOR INFLUENZA VACCINATION: ICD-10-CM

## 2018-12-11 DIAGNOSIS — N39.43 POST-VOID DRIBBLING: ICD-10-CM

## 2018-12-11 LAB
BILIRUB BLD-MCNC: NEGATIVE MG/DL
CLARITY, POC: CLEAR
COLOR UR: YELLOW
GLUCOSE UR STRIP-MCNC: NEGATIVE MG/DL
KETONES UR QL: NEGATIVE
LEUKOCYTE EST, POC: NEGATIVE
NITRITE UR-MCNC: NEGATIVE MG/ML
PH UR: 6 [PH] (ref 5–8)
PROT UR STRIP-MCNC: NEGATIVE MG/DL
RBC # UR STRIP: NEGATIVE /UL
SP GR UR: 1.01 (ref 1–1.03)
UROBILINOGEN UR QL: NORMAL

## 2018-12-11 PROCEDURE — 81003 URINALYSIS AUTO W/O SCOPE: CPT | Performed by: PHYSICIAN ASSISTANT

## 2018-12-11 PROCEDURE — 90674 CCIIV4 VAC NO PRSV 0.5 ML IM: CPT | Performed by: PHYSICIAN ASSISTANT

## 2018-12-11 PROCEDURE — 90471 IMMUNIZATION ADMIN: CPT | Performed by: PHYSICIAN ASSISTANT

## 2018-12-11 PROCEDURE — 99214 OFFICE O/P EST MOD 30 MIN: CPT | Performed by: PHYSICIAN ASSISTANT

## 2018-12-11 RX ORDER — TRIAMCINOLONE ACETONIDE 1 MG/ML
LOTION TOPICAL 2 TIMES DAILY PRN
Qty: 60 ML | Refills: 0 | Status: SHIPPED | OUTPATIENT
Start: 2018-12-11 | End: 2020-12-07

## 2018-12-11 RX ORDER — SYRINGE WITH NEEDLE, 1 ML 25GX5/8"
SYRINGE, EMPTY DISPOSABLE MISCELLANEOUS
Qty: 50 EACH | Refills: 0 | Status: SHIPPED | OUTPATIENT
Start: 2018-12-11 | End: 2019-05-03

## 2018-12-11 RX ORDER — DESVENLAFAXINE SUCCINATE 50 MG/1
50 TABLET, EXTENDED RELEASE ORAL DAILY
Qty: 30 TABLET | Refills: 5 | Status: SHIPPED | OUTPATIENT
Start: 2018-12-11 | End: 2019-05-03

## 2018-12-11 RX ORDER — SYRINGE WITH NEEDLE, 1 ML 25GX5/8"
SYRINGE, EMPTY DISPOSABLE MISCELLANEOUS
Qty: 100 EACH | Refills: 2 | Status: SHIPPED | OUTPATIENT
Start: 2018-12-11 | End: 2019-05-03

## 2018-12-11 NOTE — PROGRESS NOTES
Cooper Luu is a 43 y.o. male.     Subjective   History of Present Illness   Here today for follow up of low testosterone. He has been experiencing an occasional dull ache in the penis for the last 2-3 weeks. No pain in testicles. Some dribbling after urination for the last year which is worseneing. PSA 6 months ago was well within normal at 0.5.  He is experiencing very low-libido which he has noticed seems some better if he goes longer than normal between testosterone injections.  He decreased testosterone injections to 200 mg every 2 weeks per Dr. Schafer's recommendation around 6 months ago. Last injection 4 days ago. He has been gaining weight steadily and is unsure why. He also admits to dysphoric mood, anhedonia and somnolence which have become more prominent in recent months. He denies SI or HI.     Rash: eczema very bothersome both hands. Using triamcinolone ointment at bedtime which does not seem to help significantly and he cannot use more than once per day.     In the evenings when he sits down to relax his legs begin to twitch which feels like it starts in the hip and ends in his toes. No associated pain. Symptoms are worse if his low-back is bothering him.  He occasionally takes gabapentin which helps.          The following portions of the patient's history were reviewed and updated as appropriate: allergies, current medications, past family history, past medical history, past social history, past surgical history and problem list.    Review of Systems    Constitutional: fatigue, weight gain.  Negative for appetite change, chills, fever.  HENT: Negative for congestion, ear pain, hearing loss, nosebleeds, postnasal drip, rhinorrhea, sore throat, tinnitus and trouble swallowing.    Eyes: Negative for photophobia, discharge and visual disturbance.   Respiratory: Negative for cough, chest tightness, shortness of breath and wheezing.    Cardiovascular: Negative for chest pain, palpitations and leg  "swelling.   Gastrointestinal: Negative for abdominal distention, abdominal pain, blood in stool, constipation, diarrhea, nausea and vomiting.   Endocrine: low-libido.  Negative for cold intolerance, heat intolerance, polydipsia, polyphagia and polyuria.   Musculoskeletal: low back pain. Negative for joint swelling, myalgias, neck pain and neck stiffness.   Skin: rash. Negative for color change, pallor and wound.   Allergic/Immunologic: Negative for environmental allergies, food allergies and immunocompromised state.   Neurological: legs twitching.  Negative for dizziness, tremors, seizures, weakness, numbness and headaches.   Hematological: Negative for adenopathy. Does not bruise/bleed easily.   Psychiatric/Behavioral:  dysphoric mood, anhedonia and somnolence. Negative for sleep disturbances, agitation, behavioral problems, confusion, hallucinations, self-injury and suicidal ideas.  : post-void dribbling. Ache in penis.       Objective    Physical Exam  Constitutional: Overweight. Appears well-developed and well-nourished.   Head: Normocephalic and atraumatic.   Eyes: EOM are normal. Pupils are equal, round, and reactive to light.   Neck: Normal range of motion. Neck supple.   Cardiovascular: Normal rate, regular rhythm and normal heart sounds.    Pulmonary/Chest: Effort normal and breath sounds normal. No respiratory distress.  Has no wheezes or rales. Exhibits no chest wall tenderness.   Abdominal: Soft. Bowel sounds are normal. Exhibits no distension and no mass. There is no tenderness.   Musculoskeletal: lumbar vertebral tenderness. Normal range of motion.   Neurological: Alert and oriented to person, place, and time.   Skin: eczema bilateral hands at base of 3rd fingers.  Skin is warm and dry.   Psychiatric: Has a normal mood and affect. Behavior is normal. Judgment and thought content normal.       /84   Pulse 95   Temp 98.7 °F (37.1 °C)   Ht 193 cm (75.98\")   Wt (!) 169 kg (372 lb)   SpO2 99%  " " BMI 45.30 kg/m²     Nursing note and vitals reviewed.      Assessment/Plan   Cooper was seen today for follow-up and rash.    Diagnoses and all orders for this visit:    Weight gain  -     Hemoglobin A1c    Low testosterone  -     B-D 3CC LUER-CAROL SYR 16TP2-9/2 18G X 1-1/2\" 3 ML misc; Use every 10 days to draw up testosterone for injection.  -     B-D 3CC LUER-CAROL SYR 72CL0-5/2 22G X 1-1/2\" 3 ML misc; USE ONE EVERY 10 DAYS WITH TESTOSTERONE INJECTION  -     Testosterone, Free, Total  -     Comprehensive Metabolic Panel  -     CBC (No Diff)  Decrease to 200 mg monthly which will make next injection due 1st week of January.  May consider further decrease in the near future since symptoms are still present when testosterone is within normal with treatment.    Low libido  -     Testosterone, Free, Total    Post-void dribbling  -     POCT urinalysis dipstick, automated  Brief Urine Lab Results  (Last result in the past 365 days)      Color   Clarity   Blood   Leuk Est   Nitrite   Protein   CREAT   Urine HCG        12/11/18 0827 Yellow Clear Negative Negative Negative Negative             Current mild episode of major depressive disorder without prior episode (CMS/[facility])  -     Begin: desvenlafaxine (PRISTIQ) 50 MG 24 hr tablet; Take 1 tablet by mouth Daily.  Risks, benefits and potential side effects of medication reviewed and patient agrees to use.     Eczema, unspecified type  -     triamcinolone (KENALOG) 0.1 % lotion; Apply  topically to the appropriate area as directed 2 (Two) Times a Day As Needed (hand eczema).    Need for influenza vaccination  -     Flucelvax Quad=>4Years ([id number])             "

## 2018-12-13 DIAGNOSIS — R79.89 LOW TESTOSTERONE: ICD-10-CM

## 2018-12-13 LAB
ALBUMIN SERPL-MCNC: 4.6 G/DL (ref 3.5–5)
ALBUMIN/GLOB SERPL: 1.4 G/DL (ref 1–2)
ALP SERPL-CCNC: 66 U/L (ref 38–126)
ALT SERPL-CCNC: 60 U/L (ref 13–69)
AST SERPL-CCNC: 41 U/L (ref 15–46)
BILIRUB SERPL-MCNC: 0.8 MG/DL (ref 0.2–1.3)
BUN SERPL-MCNC: 11 MG/DL (ref 7–20)
BUN/CREAT SERPL: 12.2 (ref 6.3–21.9)
CALCIUM SERPL-MCNC: 9.7 MG/DL (ref 8.4–10.2)
CHLORIDE SERPL-SCNC: 102 MMOL/L (ref 98–107)
CO2 SERPL-SCNC: 25 MMOL/L (ref 26–30)
CREAT SERPL-MCNC: 0.9 MG/DL (ref 0.6–1.3)
ERYTHROCYTE [DISTWIDTH] IN BLOOD BY AUTOMATED COUNT: 13.6 % (ref 11.5–14.5)
GLOBULIN SER CALC-MCNC: 3.3 GM/DL
GLUCOSE SERPL-MCNC: 105 MG/DL (ref 74–98)
HBA1C MFR BLD: 5.6 %
HCT VFR BLD AUTO: 49.8 % (ref 42–52)
HGB BLD-MCNC: 16.7 G/DL (ref 14–18)
MCH RBC QN AUTO: 30.8 PG (ref 27–31)
MCHC RBC AUTO-ENTMCNC: 33.5 G/DL (ref 30–37)
MCV RBC AUTO: 91.9 FL (ref 80–94)
PLATELET # BLD AUTO: 288 10*3/MM3 (ref 130–400)
POTASSIUM SERPL-SCNC: 4.4 MMOL/L (ref 3.5–5.1)
PROT SERPL-MCNC: 7.9 G/DL (ref 6.3–8.2)
RBC # BLD AUTO: 5.42 10*6/MM3 (ref 4.7–6.1)
SODIUM SERPL-SCNC: 137 MMOL/L (ref 137–145)
TESTOST FREE SERPL-MCNC: 27.8 PG/ML (ref 6.8–21.5)
TESTOST SERPL-MCNC: 825 NG/DL (ref 264–916)
WBC # BLD AUTO: 10.29 10*3/MM3 (ref 4.8–10.8)

## 2018-12-13 RX ORDER — TESTOSTERONE CYPIONATE 200 MG/ML
200 INJECTION, SOLUTION INTRAMUSCULAR
Qty: 1 ML | Refills: 5 | Status: SHIPPED | OUTPATIENT
Start: 2018-12-13 | End: 2019-05-03

## 2018-12-14 ENCOUNTER — OFFICE VISIT (OUTPATIENT)
Dept: INTERNAL MEDICINE | Facility: CLINIC | Age: 43
End: 2018-12-14

## 2018-12-14 VITALS
WEIGHT: 315 LBS | HEART RATE: 95 BPM | DIASTOLIC BLOOD PRESSURE: 96 MMHG | BODY MASS INDEX: 38.36 KG/M2 | SYSTOLIC BLOOD PRESSURE: 142 MMHG | TEMPERATURE: 98.5 F | OXYGEN SATURATION: 99 % | HEIGHT: 76 IN

## 2018-12-14 DIAGNOSIS — G51.0 LEFT-SIDED BELL'S PALSY: Primary | ICD-10-CM

## 2018-12-14 PROCEDURE — 99213 OFFICE O/P EST LOW 20 MIN: CPT | Performed by: PHYSICIAN ASSISTANT

## 2018-12-14 RX ORDER — METHYLPREDNISOLONE 4 MG/1
TABLET ORAL
Qty: 1 EACH | Refills: 0 | Status: SHIPPED | OUTPATIENT
Start: 2018-12-14 | End: 2018-12-14 | Stop reason: ALTCHOICE

## 2018-12-14 RX ORDER — PREDNISONE 20 MG/1
TABLET ORAL
Qty: 27 TABLET | Refills: 0 | Status: SHIPPED | OUTPATIENT
Start: 2018-12-14 | End: 2019-05-03

## 2018-12-14 RX ORDER — VALACYCLOVIR HYDROCHLORIDE 1 G/1
1000 TABLET, FILM COATED ORAL 3 TIMES DAILY
Qty: 21 TABLET | Refills: 0 | Status: SHIPPED | OUTPATIENT
Start: 2018-12-14 | End: 2018-12-21

## 2018-12-14 NOTE — PATIENT INSTRUCTIONS
Bell Palsy, Adult  Bell palsy is a short-term inability to move muscles in part of the face. The inability to move (paralysis) results from inflammation or compression of the facial nerve, which travels along the skull and under the ear to the side of the face (7th cranial nerve). This nerve is responsible for facial movements that include blinking, closing the eyes, smiling, and frowning.  What are the causes?  The exact cause of this condition is not known. It may be caused by an infection from a virus, such as the chickenpox (herpes zoster), Tenzin-Barr, or mumps virus.  What increases the risk?  You are more likely to develop this condition if:  · You are pregnant.  · You have diabetes.  · You have had a recent infection in your nose, throat, or airways (upper respiratory infection).  · You have a weakened body defense system (immune system).  · You have had a facial injury, such as a fracture.  · You have a family history of Bell palsy.    What are the signs or symptoms?  Symptoms of this condition include:  · Weakness on one side of the face.  · Drooping eyelid and corner of the mouth.  · Excessive tearing in one eye.  · Difficulty closing the eyelid.  · Dry eye.  · Drooling.  · Dry mouth.  · Changes in taste.  · Change in facial appearance.  · Pain behind one ear.  · Ringing in one or both ears.  · Sensitivity to sound in one ear.  · Facial twitching.  · Headache.  · Impaired speech.  · Dizziness.  · Difficulty eating or drinking.    Most of the time, only one side of the face is affected. Rarely, Bell palsy affects the whole face.  How is this diagnosed?  This condition is diagnosed based on:  · Your symptoms.  · Your medical history.  · A physical exam.    You may also have to see health care providers who specialize in disorders of the nerves (neurologist) or diseases and conditions of the eye (ophthalmologist). You may have tests, such as:  · A test to check for nerve damage (electromyogram).  · Imaging  studies, such as CT or MRI scans.  · Blood tests.    How is this treated?  This condition affects every person differently. Sometimes symptoms go away without treatment within a couple weeks. If treatment is needed, it varies from person to person. The goal of treatment is to reduce inflammation and protect the eye from damage. Treatment for Bell palsy may include:  · Medicines, such as:  ? Steroids to reduce swelling and inflammation.  ? Antiviral drugs.  ? Pain relievers, including aspirin, acetaminophen, or ibuprofen.  · Eye drops or ointment to keep your eye moist.  · Eye protection, if you cannot close your eye.  · Exercises or massage to regain muscle strength and function (physical therapy).    Follow these instructions at home:  · Take over-the-counter and prescription medicines only as told by your health care provider.  · If your eye is affected:  ? Keep your eye moist with eye drops or ointment as told by your health care provider.  ? Follow instructions for eye care and protection as told by your health care provider.  · Do any physical therapy exercises as told by your health care provider.  · Keep all follow-up visits as told by your health care provider. This is important.  Contact a health care provider if:  · You have a fever.  · Your symptoms do not get better within 2-3 weeks, or your symptoms get worse.  · Your eye is red, irritated, or painful.  · You have new symptoms.  Get help right away if:  · You have weakness or numbness in a part of your body other than your face.  · You have trouble swallowing.  · You develop neck pain or stiffness.  · You develop dizziness or shortness of breath.  Summary  · Bell palsy is a short-term inability to move muscles in part of the face. The inability to move (paralysis) results from inflammation or compression of the facial nerve.  · This condition affects every person differently. Sometimes symptoms go away without treatment within a couple weeks.  · If  treatment is needed, it varies from person to person. The goal of treatment is to reduce inflammation and protect the eye from damage.  · Contact your health care provider if your symptoms do not get better within 2-3 weeks, or your symptoms get worse.  This information is not intended to replace advice given to you by your health care provider. Make sure you discuss any questions you have with your health care provider.  Document Released: 12/18/2006 Document Revised: 02/20/2018 Document Reviewed: 02/20/2018  Elsevier Interactive Patient Education © 2018 [employer]

## 2018-12-18 DIAGNOSIS — N40.1 BENIGN PROSTATIC HYPERPLASIA WITH LOWER URINARY TRACT SYMPTOMS, SYMPTOM DETAILS UNSPECIFIED: Primary | ICD-10-CM

## 2018-12-18 RX ORDER — TADALAFIL 10 MG/1
10 TABLET ORAL DAILY PRN
Status: CANCELLED | OUTPATIENT
Start: 2018-12-18

## 2018-12-18 RX ORDER — TADALAFIL 5 MG/1
5 TABLET ORAL DAILY
Qty: 30 TABLET | Refills: 5 | Status: SHIPPED | OUTPATIENT
Start: 2018-12-18 | End: 2019-05-03

## 2019-01-18 RX ORDER — BUPROPION HYDROCHLORIDE 150 MG/1
150 TABLET ORAL DAILY
Qty: 30 TABLET | Refills: 5 | Status: SHIPPED | OUTPATIENT
Start: 2019-01-18 | End: 2019-05-03

## 2019-04-12 DIAGNOSIS — R68.82 LOW LIBIDO: ICD-10-CM

## 2019-04-12 DIAGNOSIS — R79.89 LOW TESTOSTERONE: Primary | ICD-10-CM

## 2019-04-12 DIAGNOSIS — N40.1 BENIGN PROSTATIC HYPERPLASIA WITH LOWER URINARY TRACT SYMPTOMS, SYMPTOM DETAILS UNSPECIFIED: ICD-10-CM

## 2019-05-03 ENCOUNTER — OFFICE VISIT (OUTPATIENT)
Dept: INTERNAL MEDICINE | Facility: CLINIC | Age: 44
End: 2019-05-03

## 2019-05-03 VITALS
OXYGEN SATURATION: 100 % | HEART RATE: 77 BPM | TEMPERATURE: 98.8 F | BODY MASS INDEX: 38.36 KG/M2 | SYSTOLIC BLOOD PRESSURE: 132 MMHG | DIASTOLIC BLOOD PRESSURE: 86 MMHG | HEIGHT: 76 IN | WEIGHT: 315 LBS

## 2019-05-03 DIAGNOSIS — M25.561 RIGHT MEDIAL KNEE PAIN: Primary | ICD-10-CM

## 2019-05-03 PROCEDURE — 99213 OFFICE O/P EST LOW 20 MIN: CPT | Performed by: PHYSICIAN ASSISTANT

## 2019-05-03 NOTE — PROGRESS NOTES
Cooper Luu is a 44 y.o. male.     Subjective   History of Present Illness   Here today with concern of right knee pain which he believes he injured while giving his dog a bath around 3.5 weeks ago when he was kneeling on a rolled up towel and turned slightly which caused a pain in the knee and possibly a pop. Pain is not bothersome when sitting or laying down.  There is pain with any weight bearing (2/10 on pain scale) which becomes much more bothersome (6/10 on pain scale) when he moves to stand up and when using stairs.  He sometimes has a limp but not always.  He feels there has been a little swelling but denies deformity, redness, abnormal warmth, weakness or numbness.  He has tried Aleve which helps short-term.  He has also tried wearing a compression brace which did not help.  No previous similar occurrences.          The following portions of the patient's history were reviewed and updated as appropriate: allergies, current medications, past family history, past medical history, past social history, past surgical history and problem list.    Review of Systems   Constitutional: Negative for appetite change, chills, fatigue, fever and unexpected weight change.   Respiratory: Negative for cough, chest tightness, shortness of breath and wheezing.    Cardiovascular: Negative for chest pain, palpitations and leg swelling.   Musculoskeletal: Positive for arthralgias (right knee), gait problem and joint swelling. Negative for myalgias and neck pain.   Skin: Negative for color change, pallor, rash and wound.   Neurological: Negative for weakness and numbness.   Psychiatric/Behavioral: Negative for dysphoric mood, self-injury and suicidal ideas. The patient is not nervous/anxious.          Objective    Physical Exam   Constitutional: He is oriented to person, place, and time. He appears well-developed and well-nourished. No distress.   Eyes: Conjunctivae and EOM are normal. Pupils are equal, round, and reactive  "to light.   Neck: Normal range of motion. Neck supple.   Cardiovascular: Normal rate, regular rhythm and normal heart sounds. Exam reveals no gallop and no friction rub.   No murmur heard.  Pulmonary/Chest: Effort normal and breath sounds normal. No respiratory distress. He has no wheezes. He has no rales.   Abdominal: Soft. Bowel sounds are normal. There is no tenderness.   Musculoskeletal: He exhibits edema. He exhibits no deformity.        Right knee: He exhibits decreased range of motion (pain with full extension of the knee) and swelling ( minimal edema over MCL). He exhibits no effusion, no ecchymosis, no deformity, no laceration, no erythema, normal alignment, no LCL laxity, normal patellar mobility, no bony tenderness, normal meniscus and no MCL laxity. No tenderness found. No medial joint line, no lateral joint line, no MCL, no LCL and no patellar tendon tenderness noted.   Neurological: He is alert and oriented to person, place, and time. He displays normal reflexes. No cranial nerve deficit or sensory deficit. He exhibits normal muscle tone. Coordination normal.   Skin: Skin is warm and dry. Capillary refill takes less than 2 seconds. No rash noted. He is not diaphoretic.   Psychiatric: He has a normal mood and affect. His behavior is normal. Judgment and thought content normal.   Nursing note and vitals reviewed.        /86   Pulse 77   Temp 98.8 °F (37.1 °C)   Ht 193 cm (75.98\")   Wt (!) 167 kg (368 lb)   SpO2 100%   BMI 44.81 kg/m²     Nursing note and vitals reviewed.        Assessment/Plan   Cooper was seen today for knee pain.    Diagnoses and all orders for this visit:    Right medial knee pain  -     Ambulatory Referral to Orthopedic Surgery    Exam mostly unremarkable with the exception of pain elicited with full extension of the knee.     Encouraged him to ice the knee after physical activity and continue to use Aleve prn with food for pain control.              "

## 2019-08-20 ENCOUNTER — OFFICE VISIT (OUTPATIENT)
Dept: UROLOGY | Facility: CLINIC | Age: 44
End: 2019-08-20

## 2019-08-20 VITALS
DIASTOLIC BLOOD PRESSURE: 80 MMHG | TEMPERATURE: 97.6 F | HEART RATE: 87 BPM | SYSTOLIC BLOOD PRESSURE: 138 MMHG | OXYGEN SATURATION: 97 % | WEIGHT: 315 LBS | HEIGHT: 76 IN | BODY MASS INDEX: 38.36 KG/M2

## 2019-08-20 DIAGNOSIS — R79.89 LOW TESTOSTERONE: ICD-10-CM

## 2019-08-20 DIAGNOSIS — N40.1 BENIGN PROSTATIC HYPERPLASIA WITH LOWER URINARY TRACT SYMPTOMS, SYMPTOM DETAILS UNSPECIFIED: Primary | ICD-10-CM

## 2019-08-20 DIAGNOSIS — L70.0 CYSTIC ACNE: ICD-10-CM

## 2019-08-20 PROCEDURE — 99204 OFFICE O/P NEW MOD 45 MIN: CPT | Performed by: UROLOGY

## 2019-08-20 NOTE — PROGRESS NOTES
Chief Complaint  LUTS    Referring Provider  Shaina Fields, *    HPI  Mr. Luu is a 44 y.o. male former [employer], deployed in [place] with history of obesity who presents with low testosterone, lower urinary tract symptoms.  Primary symptom includes: Decreased libido, nonexistent energy, difficulty sleeping, mental clarity issues.  He is mostly concerned about low testosterone today.  His main urinary issues are daily urinary frequency, nocturia, weak stream.  Patient denies dysuria or hematuria.  Onset was many years ago in 2012 ago.  He is unsure if there is a temporal relationship between his deployment and periods of extreme stress with his development of low testosterone.  He denies any past anabolic steroid or illicit testosterone usage.    He does admit to a history of back injury, with surgeries, nerve impingement, and  orthopedic hardware.  He says that if he sits for too long his leg becomes numb.  He admits to weight gain and decrease in size of penis.    He has had multiple issues with with recurrent skin infections and cellulitis of the lower extremities and groin in the past.    Previous treatments include: AndroGel, testosterone cypionate injections    IPSS Questionnaire (AUA-7):  Over the past month…    1)  Incomplete Emptying  How often have you had a sensation of not emptying your bladder?  5 - Almost always   2)  Frequency  How often have you had to urinate less than every two hours? 5 - Almost always   3)  Intermittency  How often have you found you stopped and started again several times when you urinated?  1 - Less than 1 time in 5   4) Urgency  How often have you found it difficult to postpone urination?  5 - Almost always   5) Weak Stream  How often have you had a weak urinary stream?  5 - Almost always   6) Straining  How often have you had to push or strain to begin urination?  0 - Not at all   7) Nocturia  How many times did you typically get up at night to urinate?  3 - 3 times    Total Score:  24       Quality of life due to urinary symptoms:  If you were to spend the rest of your life with your urinary condition the way it is now, how would you feel about that? 4-Mostly Dissatisfied   Urine Leakage (Incontinence) 0-No Leakage       Past Medical History  Past Medical History:   Diagnosis Date   • Cystic acne    • Eczema    • Injury of back    • Low testosterone        Past Surgical History  Past Surgical History:   Procedure Laterality Date   • ANKLE SURGERY     • BACK SURGERY     • CERVICAL DISCECTOMY ANTERIOR     • CHOLECYSTECTOMY         Medications    Current Outpatient Medications:   •  busPIRone (BUSPAR) 10 MG tablet, Take 1 tablet by mouth 3 (Three) Times a Day As Needed (anxiety)., Disp: 90 tablet, Rfl: 5  •  triamcinolone (KENALOG) 0.1 % lotion, Apply  topically to the appropriate area as directed 2 (Two) Times a Day As Needed (hand eczema)., Disp: 60 mL, Rfl: 0    Allergies  Allergies   Allergen Reactions   • Hydrocodone-Acetaminophen Nausea Only, Other (See Comments), Hives, Itching and Rash   • Sulfa Antibiotics Itching and Rash       Social History  Social History     Socioeconomic History   • Marital status:      Spouse name: Not on file   • Number of children: Not on file   • Years of education: Not on file   • Highest education level: Not on file   Tobacco Use   • Smoking status: Current Every Day Smoker     Packs/day: 1.00   • Smokeless tobacco: Never Used   Substance and Sexual Activity   • Alcohol use: Yes     Comment: occasionally   • Drug use: No       Family History  He has no family history of prostate cancer    Review of Systems  Constitutional: No fevers or chills; positive decreased energy  Skin: Positive for multiple ingrown hairs and boils of lower extremity  Endocrine: No heat/cold intolerance   Cardiovascular: Negative for chest pain or dyspnea on exertion  Respiratory: Negative for shortness of breath or wheezing  Gastrointestinal: No nausea or  "vomiting  Genitourinary: Negative for current gross hematuria.  Musculoskeletal: No flank pain; positive intermittent low back pain  Neurological:  Negative for frequent headaches or dizziness  Lymph/Heme: Negative for leg swelling or calf pain.    Physical Exam  Visit Vitals  /80   Pulse 87   Temp 97.6 °F (36.4 °C)   Ht 193 cm (75.98\")   Wt (!) 167 kg (368 lb)   SpO2 97%   BMI 44.81 kg/m²     Constitutional: NAD, WDWN.   HEENT: NCAT. Conjunctivae normal.  MMM.    Cardiovascular: Regular rate.  Pulmonary/Chest: Respirations are even and non-labored bilaterally.  Abdominal: Soft. No distension, tenderness, masses or guarding. No CVA tenderness.  Neurological: A + O x 3.  Cranial Nerves II-XII grossly intact. Normal gait.  Extremities: TISHA x 4, Warm. No clubbing.  No cyanosis.    Skin: Pink, warm and dry.  Multiple ingrown hairs and excoriations from past follicular infections  Psychiatric:  Normal mood and affect    Genitourinary  Penis: circumcised partially buried penis, glans normal, no penile discharge.  No rashes/lesions.    Testes: descended bilaterally, no masses, nontender to palpation. Remainder of scrotal contents normal. No hernia appreciated.  Rectal:  Normal tone, no masses.      Labs  Lab Results   Component Value Date    PSA 0.500 05/24/2018       Brief Urine Lab Results  (Last result in the past 365 days)      Color   Clarity   Blood   Leuk Est   Nitrite   Protein   CREAT   Urine HCG        12/11/18 0827 Yellow Clear Negative Negative Negative Negative               PVR  Post-void residual performed by staff - 18      Assessment  Mr. Luu is a 44 y.o. male who presents with LUTS, primarily weak stream, urinary frequency, nocturia.  The main thing that concerns him though today is low testosterone.  He has normal-appearing labs were from last year when he was on injections.  He has been off treatment for 8 months now it is severely symptomatic.    Plan  1.  T labs -total and free testosterone, " SHBG, estradiol, prolactin, hematocrit, PSA  2.  FU in 2 weeks for cystoscopy and uroflow; we will discuss testosterone options more at that time.  Currently he is leaning towards either Clomid/anastrozole or Testopel.      Julito Chow MD

## 2019-08-28 LAB
ESTRADIOL SERPL-MCNC: 27 PG/ML (ref 7.6–42.6)
HCT VFR BLD AUTO: 48.1 % (ref 37.5–51)
HGB BLD-MCNC: 15.4 G/DL (ref 13–17.7)
PROLACTIN SERPL-MCNC: 8.7 NG/ML (ref 4–15.2)
PSA SERPL-MCNC: 0.5 NG/ML (ref 0–4)
SHBG SERPL-SCNC: 18 NMOL/L (ref 16.5–55.9)
TESTOST FREE SERPL-MCNC: 8.2 PG/ML (ref 6.8–21.5)
TESTOST SERPL-MCNC: 173 NG/DL (ref 264–916)

## 2019-09-16 ENCOUNTER — PROCEDURE VISIT (OUTPATIENT)
Dept: UROLOGY | Facility: CLINIC | Age: 44
End: 2019-09-16

## 2019-09-16 VITALS — RESPIRATION RATE: 18 BRPM | WEIGHT: 315 LBS | BODY MASS INDEX: 38.36 KG/M2 | HEIGHT: 76 IN

## 2019-09-16 DIAGNOSIS — R39.9 LOWER URINARY TRACT SYMPTOMS (LUTS): Primary | ICD-10-CM

## 2019-09-16 DIAGNOSIS — R79.89 LOW TESTOSTERONE: ICD-10-CM

## 2019-09-16 PROCEDURE — 52000 CYSTOURETHROSCOPY: CPT | Performed by: UROLOGY

## 2019-09-16 PROCEDURE — 99213 OFFICE O/P EST LOW 20 MIN: CPT | Performed by: UROLOGY

## 2019-09-16 PROCEDURE — 51798 US URINE CAPACITY MEASURE: CPT | Performed by: UROLOGY

## 2019-09-16 NOTE — PROGRESS NOTES
Chief Complaint  LUTS  Low testosterone      HPI  Mr. Luu is a 44 y.o. male former [employer], deployed in [place] with history of obesity who presents with low testosterone, lower urinary tract symptoms.      For historical review,  Primary symptom includes: Decreased libido, nonexistent energy, difficulty sleeping, mental clarity issues.  He is mostly concerned about low testosterone today.  His main urinary issues are daily urinary frequency, nocturia, weak stream.  Patient denies dysuria or hematuria.  Onset was many years ago in 2012 ago.  He is unsure if there is a temporal relationship between his deployment and periods of extreme stress with his development of low testosterone.  He denies any past anabolic steroid or illicit testosterone usage.    He does admit to a history of back injury, with surgeries, nerve impingement, and  orthopedic hardware.  He says that if he sits for too long his leg becomes numb.  He admits to weight gain and decrease in size of penis.    He has had multiple issues with with recurrent skin infections and cellulitis of the lower extremities and groin in the past.    Previous treatments include: AndroGel, testosterone cypionate injections    IPSS Questionnaire (AUA-7):  Over the past month…    1)  Incomplete Emptying  How often have you had a sensation of not emptying your bladder?  5 - Almost always   2)  Frequency  How often have you had to urinate less than every two hours? 5 - Almost always   3)  Intermittency  How often have you found you stopped and started again several times when you urinated?  1 - Less than 1 time in 5   4) Urgency  How often have you found it difficult to postpone urination?  5 - Almost always   5) Weak Stream  How often have you had a weak urinary stream?  5 - Almost always   6) Straining  How often have you had to push or strain to begin urination?  0 - Not at all   7) Nocturia  How many times did you typically get up at night to urinate?  3 - 3 times    Total Score:  24       Quality of life due to urinary symptoms:  If you were to spend the rest of your life with your urinary condition the way it is now, how would you feel about that? 4-Mostly Dissatisfied   Urine Leakage (Incontinence) 0-No Leakage       Past Medical History  Past Medical History:   Diagnosis Date   • Cystic acne    • Eczema    • Injury of back    • Low testosterone        Past Surgical History  Past Surgical History:   Procedure Laterality Date   • ANKLE SURGERY     • BACK SURGERY     • CERVICAL DISCECTOMY ANTERIOR     • CHOLECYSTECTOMY         Medications    Current Outpatient Medications:   •  busPIRone (BUSPAR) 10 MG tablet, Take 1 tablet by mouth 3 (Three) Times a Day As Needed (anxiety)., Disp: 90 tablet, Rfl: 5  •  triamcinolone (KENALOG) 0.1 % lotion, Apply  topically to the appropriate area as directed 2 (Two) Times a Day As Needed (hand eczema)., Disp: 60 mL, Rfl: 0    Allergies  Allergies   Allergen Reactions   • Hydrocodone-Acetaminophen Nausea Only, Other (See Comments), Hives, Itching and Rash   • Sulfa Antibiotics Itching and Rash       Social History  Social History     Socioeconomic History   • Marital status:      Spouse name: Not on file   • Number of children: Not on file   • Years of education: Not on file   • Highest education level: Not on file   Tobacco Use   • Smoking status: Current Every Day Smoker     Packs/day: 1.00   • Smokeless tobacco: Never Used   Substance and Sexual Activity   • Alcohol use: Yes     Comment: occasionally   • Drug use: No       Family History  He has no family history of prostate cancer    Review of Systems  Constitutional: No fevers or chills; positive decreased energy  Skin: Positive for multiple ingrown hairs and boils of lower extremity  Endocrine: No heat/cold intolerance   Cardiovascular: Negative for chest pain or dyspnea on exertion  Respiratory: Negative for shortness of breath or wheezing  Gastrointestinal: No nausea or  "vomiting  Genitourinary: Negative for current gross hematuria.  Musculoskeletal: No flank pain; positive intermittent low back pain  Neurological:  Negative for frequent headaches or dizziness  Lymph/Heme: Negative for leg swelling or calf pain.    Physical Exam  Visit Vitals  Resp 18   Ht 193 cm (75.98\")   Wt (!) 167 kg (368 lb)   BMI 44.81 kg/m²     Constitutional: NAD, WDWN.   HEENT: NCAT. Conjunctivae normal.  MMM.    Cardiovascular: Regular rate.  Pulmonary/Chest: Respirations are even and non-labored bilaterally.  Abdominal: Soft. No distension, tenderness, masses or guarding. No CVA tenderness.  Neurological: A + O x 3.  Cranial Nerves II-XII grossly intact. Normal gait.  Extremities: TISHA x 4, Warm. No clubbing.  No cyanosis.    Skin: Pink, warm and dry.  Multiple ingrown hairs and excoriations from past follicular infections  Psychiatric:  Normal mood and affect    Genitourinary  Penis: circumcised partially buried penis, glans normal, no penile discharge.  No rashes/lesions.    Testes: descended bilaterally, no masses, nontender to palpation. Remainder of scrotal contents normal. No hernia appreciated.  Rectal:  Normal tone, no masses.      Labs  Lab Results   Component Value Date    PSA 0.497 08/27/2019    PSA 0.500 05/24/2018       Brief Urine Lab Results  (Last result in the past 365 days)      Color   Clarity   Blood   Leuk Est   Nitrite   Protein   CREAT   Urine HCG        12/11/18 0827 Yellow Clear Negative Negative Negative Negative             Results for BEATRIZ SOUSA (MRN [id number]) as of 9/16/2019 08:44   Ref. Range 8/27/2019 08:31   Prolactin Latest Ref Range: 4.0 - 15.2 ng/mL 8.7   Estradiol Latest Ref Range: 7.6 - 42.6 pg/mL 27.0   Sex Hormone Binding Globulin Latest Ref Range: 16.5 - 55.9 nmol/L 18.0   Testosterone, Total Latest Ref Range: 264 - 916 ng/dL 173 (L)   Testosterone, Free Latest Ref Range: 6.8 - 21.5 pg/mL 8.2   Hemoglobin Latest Ref Range: 13.0 - 17.7 g/dL 15.4   Hematocrit " Latest Ref Range: 37.5 - 51.0 % 48.1     He was not able to void for a uroflow, so a repeat postvoid residual was performed, which was done for evaluation with his new urethral stricture diagnosis.    PVR  Post-void residual performed by staff - 71    Preprocedure diagnosis  Weak urinary stream, urinary frequency    Postprocedure diagnosis  Bulbar urethral stricture    Procedure  Flexible Cystourethroscopy    Attending surgeon  Julito Chow MD    Anesthesia  2% lidocaine jelly intraurethrally    Complications  None    Indications  44 y.o. male undergoing a flexible cystoscopy for the above mentioned indications.  Informed consent was obtained.      Findings  Cystoscopic findings included a pinpoint bulbar urethral stricture, unable to pass the scope beyond it without significant patient discomfort.    Procedure  The patient was placed in supine position and prepped and draped in sterile fashion with lidocaine jelly per urethra for anesthesia.  A timeout was performed.  The 14F flexible cystoscope was lubricated and gently placed through the penile urethra and was unable to be advanced beyond the stricture. The cystoscope was slowly withdrawn while visualizing the urethra and the procedure terminated.  The patient tolerated the procedure well.          Assessment  Mr. Luu is a 44 y.o. male who presents with LUTS, primarily weak stream, urinary frequency, nocturia, as well as low libido and low energy, low testosterone. On cystoscopy today he was diagnosed with a pinpoint bulbar urethral stricture.    We discussed the risks, benefits, and alternatives to cystoscopy with direct vision internal urethrotomy.  I quoted him a 50% chance of the scar tissue reoccurring, as is consistent with contemporary literature.  He voices understanding and wished to proceed with that procedure.    We also had a separate discussion in a room different than his procedure regarding his testosterone management.  He clearly does  have symptomatic hypogonadism, and desires replacement.  We reviewed the risks, benefits, and alternatives to testosterone supplementation, reviewed the different methods of supplementation, and he chose Testopel.    Plan  1.  Schedule for cystoscopy, direct vision internal urethrotomy  2.  Testopel.      Julito Chow MD

## 2019-09-30 ENCOUNTER — APPOINTMENT (OUTPATIENT)
Dept: PREADMISSION TESTING | Facility: HOSPITAL | Age: 44
End: 2019-09-30

## 2019-09-30 ENCOUNTER — OFFICE VISIT (OUTPATIENT)
Dept: UROLOGY | Facility: CLINIC | Age: 44
End: 2019-09-30

## 2019-09-30 VITALS — WEIGHT: 315 LBS | RESPIRATION RATE: 18 BRPM | BODY MASS INDEX: 39.17 KG/M2 | HEIGHT: 75 IN

## 2019-09-30 VITALS
HEART RATE: 64 BPM | BODY MASS INDEX: 39.17 KG/M2 | HEIGHT: 75 IN | DIASTOLIC BLOOD PRESSURE: 94 MMHG | OXYGEN SATURATION: 99 % | SYSTOLIC BLOOD PRESSURE: 152 MMHG | WEIGHT: 315 LBS

## 2019-09-30 DIAGNOSIS — R79.89 LOW TESTOSTERONE: Primary | ICD-10-CM

## 2019-09-30 DIAGNOSIS — R39.9 LOWER URINARY TRACT SYMPTOMS (LUTS): ICD-10-CM

## 2019-09-30 LAB
ANION GAP SERPL CALCULATED.3IONS-SCNC: 14.9 MMOL/L (ref 5–15)
BACTERIA UR QL AUTO: ABNORMAL /HPF
BILIRUB UR QL STRIP: NEGATIVE
BUN BLD-MCNC: 9 MG/DL (ref 6–20)
BUN/CREAT SERPL: 11.8 (ref 7–25)
CALCIUM SPEC-SCNC: 9 MG/DL (ref 8.6–10.5)
CHLORIDE SERPL-SCNC: 103 MMOL/L (ref 98–107)
CLARITY UR: CLEAR
CO2 SERPL-SCNC: 20.1 MMOL/L (ref 22–29)
COLOR UR: YELLOW
CREAT BLD-MCNC: 0.76 MG/DL (ref 0.76–1.27)
DEPRECATED RDW RBC AUTO: 43.2 FL (ref 37–54)
ERYTHROCYTE [DISTWIDTH] IN BLOOD BY AUTOMATED COUNT: 13.2 % (ref 12.3–15.4)
GFR SERPL CREATININE-BSD FRML MDRD: 112 ML/MIN/1.73
GLUCOSE BLD-MCNC: 107 MG/DL (ref 65–99)
GLUCOSE UR STRIP-MCNC: NEGATIVE MG/DL
HCT VFR BLD AUTO: 44.9 % (ref 37.5–51)
HGB BLD-MCNC: 15.4 G/DL (ref 13–17.7)
HGB UR QL STRIP.AUTO: NEGATIVE
HYALINE CASTS UR QL AUTO: ABNORMAL /LPF
KETONES UR QL STRIP: NEGATIVE
LEUKOCYTE ESTERASE UR QL STRIP.AUTO: ABNORMAL
MCH RBC QN AUTO: 31.1 PG (ref 26.6–33)
MCHC RBC AUTO-ENTMCNC: 34.3 G/DL (ref 31.5–35.7)
MCV RBC AUTO: 90.7 FL (ref 79–97)
NITRITE UR QL STRIP: NEGATIVE
PH UR STRIP.AUTO: 6 [PH] (ref 5–8)
PLATELET # BLD AUTO: 297 10*3/MM3 (ref 140–450)
PMV BLD AUTO: 9.7 FL (ref 6–12)
POTASSIUM BLD-SCNC: 4.2 MMOL/L (ref 3.5–5.2)
PROT UR QL STRIP: NEGATIVE
RBC # BLD AUTO: 4.95 10*6/MM3 (ref 4.14–5.8)
RBC # UR: ABNORMAL /HPF
REF LAB TEST METHOD: ABNORMAL
SODIUM BLD-SCNC: 138 MMOL/L (ref 136–145)
SP GR UR STRIP: 1.02 (ref 1–1.03)
SQUAMOUS #/AREA URNS HPF: ABNORMAL /HPF
UROBILINOGEN UR QL STRIP: ABNORMAL
WBC NRBC COR # BLD: 9.43 10*3/MM3 (ref 3.4–10.8)
WBC UR QL AUTO: ABNORMAL /HPF

## 2019-09-30 PROCEDURE — 87086 URINE CULTURE/COLONY COUNT: CPT | Performed by: UROLOGY

## 2019-09-30 PROCEDURE — 93005 ELECTROCARDIOGRAM TRACING: CPT

## 2019-09-30 PROCEDURE — 36415 COLL VENOUS BLD VENIPUNCTURE: CPT

## 2019-09-30 PROCEDURE — 81001 URINALYSIS AUTO W/SCOPE: CPT | Performed by: UROLOGY

## 2019-09-30 PROCEDURE — 11980 IMPLANT HORMONE PELLET(S): CPT | Performed by: UROLOGY

## 2019-09-30 PROCEDURE — S0189 TESTOSTERONE PELLET 75 MG: HCPCS | Performed by: UROLOGY

## 2019-09-30 PROCEDURE — 80048 BASIC METABOLIC PNL TOTAL CA: CPT | Performed by: UROLOGY

## 2019-09-30 PROCEDURE — 85027 COMPLETE CBC AUTOMATED: CPT | Performed by: UROLOGY

## 2019-09-30 RX ORDER — NAPROXEN SODIUM 220 MG
220 TABLET ORAL 2 TIMES DAILY PRN
COMMUNITY
End: 2020-12-07

## 2019-09-30 NOTE — PROGRESS NOTES
OFFICE PROCEDURE NOTE      PREPROCEDURE DIAGNOSIS:  Hypogonadism.      POSTPROCEDURE DIAGNOSIS:  Hypogonadism.      PROCEDURE:  Testopel (NDC# 50538-848-33) insertion/implant in Right flank, 11 pellets implanted.      SURGEON:    Julito Chow MD    ANESTHESIA:  Local.      DRAINS:  None.      Labs reviewed,   Results for BEATRIZ SOUSA (MRN [id number]) as of 9/30/2019 09:34   Ref. Range 8/27/2019 08:31   Sex Hormone Binding Globulin Latest Ref Range: 16.5 - 55.9 nmol/L 18.0   Testosterone, Total Latest Ref Range: 264 - 916 ng/dL 173 (L)   Testosterone, Free Latest Ref Range: 6.8 - 21.5 pg/mL 8.2        The risks/benefits of the procedure were discussed with the patient.     DESCRIPTION OF PROCEDURE: The patient was placed in the lateral decubitus position and his skin was prepped with chlorhexadine solution. 10 ml of 1% lidocaine w/epinephrine were instilled subcutaneously in a straight fashion. A 3mm skin puncture was made with an 11 blade. The trochar was placed subcutaneously along the line with ease and without patient discomfort. The sharp stylet was removed and the pellets were placed along the track and positioned using the blunt stylet. Following this,  a steri strip was used to close the puncture wound. A 4/4 gauze pad was placed over the wound and a Tegaderm bandage was applied and the patient was repositioned on his opposite side for compression purposes with a roll.      PLAN:  The patient was discharged in stable condition to be followed up with serial serum testosterone levels. Follow up for next visit in 4 months for testopel; FU as planned for DVIU.

## 2019-10-01 LAB — BACTERIA SPEC AEROBE CULT: NO GROWTH

## 2019-10-07 ENCOUNTER — ANESTHESIA EVENT (OUTPATIENT)
Dept: PERIOP | Facility: HOSPITAL | Age: 44
End: 2019-10-07

## 2019-10-07 ENCOUNTER — HOSPITAL ENCOUNTER (OUTPATIENT)
Facility: HOSPITAL | Age: 44
Setting detail: HOSPITAL OUTPATIENT SURGERY
Discharge: HOME | End: 2019-10-07
Attending: UROLOGY | Admitting: UROLOGY

## 2019-10-07 ENCOUNTER — ANESTHESIA (OUTPATIENT)
Dept: PERIOP | Facility: HOSPITAL | Age: 44
End: 2019-10-07

## 2019-10-07 VITALS
TEMPERATURE: 97.6 F | SYSTOLIC BLOOD PRESSURE: 153 MMHG | RESPIRATION RATE: 16 BRPM | OXYGEN SATURATION: 100 % | HEART RATE: 72 BPM | DIASTOLIC BLOOD PRESSURE: 90 MMHG

## 2019-10-07 DIAGNOSIS — R39.9 LOWER URINARY TRACT SYMPTOMS (LUTS): ICD-10-CM

## 2019-10-07 PROCEDURE — 25010000002 FENTANYL CITRATE (PF) 100 MCG/2ML SOLUTION: Performed by: NURSE ANESTHETIST, CERTIFIED REGISTERED

## 2019-10-07 PROCEDURE — 94799 UNLISTED PULMONARY SVC/PX: CPT

## 2019-10-07 PROCEDURE — 25010000002 DEXAMETHASONE PER 1 MG: Performed by: NURSE ANESTHETIST, CERTIFIED REGISTERED

## 2019-10-07 PROCEDURE — 25010000002 PROPOFOL 200 MG/20ML EMULSION: Performed by: NURSE ANESTHETIST, CERTIFIED REGISTERED

## 2019-10-07 PROCEDURE — 25010000002 ONDANSETRON PER 1 MG: Performed by: NURSE ANESTHETIST, CERTIFIED REGISTERED

## 2019-10-07 PROCEDURE — 25010000002 KETOROLAC TROMETHAMINE PER 15 MG: Performed by: NURSE ANESTHETIST, CERTIFIED REGISTERED

## 2019-10-07 PROCEDURE — 52276 CYSTOSCOPY AND TREATMENT: CPT | Performed by: UROLOGY

## 2019-10-07 PROCEDURE — 25010000003 CEFAZOLIN SODIUM-DEXTROSE 2-3 GM-%(50ML) RECONSTITUTED SOLUTION: Performed by: UROLOGY

## 2019-10-07 PROCEDURE — 25010000002 MIDAZOLAM PER 1 MG: Performed by: NURSE ANESTHETIST, CERTIFIED REGISTERED

## 2019-10-07 RX ORDER — MIDAZOLAM HYDROCHLORIDE 1 MG/ML
INJECTION INTRAMUSCULAR; INTRAVENOUS AS NEEDED
Status: DISCONTINUED | OUTPATIENT
Start: 2019-10-07 | End: 2019-10-07 | Stop reason: SURG

## 2019-10-07 RX ORDER — KETAMINE HCL IN NACL, ISO-OSM 100MG/10ML
SYRINGE (ML) INJECTION AS NEEDED
Status: DISCONTINUED | OUTPATIENT
Start: 2019-10-07 | End: 2019-10-07 | Stop reason: SURG

## 2019-10-07 RX ORDER — MAGNESIUM HYDROXIDE 1200 MG/15ML
LIQUID ORAL AS NEEDED
Status: DISCONTINUED | OUTPATIENT
Start: 2019-10-07 | End: 2019-10-07 | Stop reason: HOSPADM

## 2019-10-07 RX ORDER — SODIUM CHLORIDE, SODIUM LACTATE, POTASSIUM CHLORIDE, CALCIUM CHLORIDE 600; 310; 30; 20 MG/100ML; MG/100ML; MG/100ML; MG/100ML
1000 INJECTION, SOLUTION INTRAVENOUS CONTINUOUS
Status: DISCONTINUED | OUTPATIENT
Start: 2019-10-07 | End: 2019-10-07 | Stop reason: HOSPADM

## 2019-10-07 RX ORDER — MEPERIDINE HYDROCHLORIDE 50 MG/ML
50 INJECTION INTRAMUSCULAR; INTRAVENOUS; SUBCUTANEOUS ONCE AS NEEDED
Status: DISCONTINUED | OUTPATIENT
Start: 2019-10-07 | End: 2019-10-07 | Stop reason: HOSPADM

## 2019-10-07 RX ORDER — KETOROLAC TROMETHAMINE 30 MG/ML
INJECTION, SOLUTION INTRAMUSCULAR; INTRAVENOUS AS NEEDED
Status: DISCONTINUED | OUTPATIENT
Start: 2019-10-07 | End: 2019-10-07 | Stop reason: SURG

## 2019-10-07 RX ORDER — CEFAZOLIN SODIUM 2 G/50ML
2 SOLUTION INTRAVENOUS ONCE
Status: COMPLETED | OUTPATIENT
Start: 2019-10-07 | End: 2019-10-07

## 2019-10-07 RX ORDER — DOCUSATE SODIUM 100 MG/1
100 CAPSULE, LIQUID FILLED ORAL 2 TIMES DAILY
Qty: 15 CAPSULE | Refills: 1 | Status: SHIPPED | OUTPATIENT
Start: 2019-10-07 | End: 2020-09-24

## 2019-10-07 RX ORDER — FENTANYL CITRATE 50 UG/ML
INJECTION, SOLUTION INTRAMUSCULAR; INTRAVENOUS AS NEEDED
Status: DISCONTINUED | OUTPATIENT
Start: 2019-10-07 | End: 2019-10-07 | Stop reason: SURG

## 2019-10-07 RX ORDER — ONDANSETRON 2 MG/ML
4 INJECTION INTRAMUSCULAR; INTRAVENOUS ONCE AS NEEDED
Status: DISCONTINUED | OUTPATIENT
Start: 2019-10-07 | End: 2019-10-07 | Stop reason: HOSPADM

## 2019-10-07 RX ORDER — OXYBUTYNIN CHLORIDE 10 MG/1
10 TABLET, EXTENDED RELEASE ORAL DAILY PRN
Qty: 10 TABLET | Refills: 0 | Status: SHIPPED | OUTPATIENT
Start: 2019-10-07 | End: 2020-09-24

## 2019-10-07 RX ORDER — CIPROFLOXACIN 500 MG/1
500 TABLET, FILM COATED ORAL 2 TIMES DAILY
Qty: 6 TABLET | Refills: 0 | Status: SHIPPED | OUTPATIENT
Start: 2019-10-07 | End: 2020-09-24

## 2019-10-07 RX ORDER — LIDOCAINE HYDROCHLORIDE 20 MG/ML
INJECTION, SOLUTION INTRAVENOUS AS NEEDED
Status: DISCONTINUED | OUTPATIENT
Start: 2019-10-07 | End: 2019-10-07 | Stop reason: SURG

## 2019-10-07 RX ORDER — PROPOFOL 10 MG/ML
INJECTION, EMULSION INTRAVENOUS AS NEEDED
Status: DISCONTINUED | OUTPATIENT
Start: 2019-10-07 | End: 2019-10-07 | Stop reason: SURG

## 2019-10-07 RX ORDER — DEXAMETHASONE SODIUM PHOSPHATE 4 MG/ML
INJECTION, SOLUTION INTRA-ARTICULAR; INTRALESIONAL; INTRAMUSCULAR; INTRAVENOUS; SOFT TISSUE AS NEEDED
Status: DISCONTINUED | OUTPATIENT
Start: 2019-10-07 | End: 2019-10-07 | Stop reason: SURG

## 2019-10-07 RX ORDER — OXYCODONE HYDROCHLORIDE 5 MG/1
5 TABLET ORAL EVERY 6 HOURS PRN
Qty: 5 TABLET | Refills: 0 | OUTPATIENT
Start: 2019-10-07 | End: 2020-12-07

## 2019-10-07 RX ORDER — ONDANSETRON 2 MG/ML
INJECTION INTRAMUSCULAR; INTRAVENOUS AS NEEDED
Status: DISCONTINUED | OUTPATIENT
Start: 2019-10-07 | End: 2019-10-07 | Stop reason: SURG

## 2019-10-07 RX ORDER — ACETAMINOPHEN 325 MG/1
650 TABLET ORAL EVERY 6 HOURS
Qty: 30 TABLET | Refills: 0 | Status: SHIPPED | OUTPATIENT
Start: 2019-10-07 | End: 2019-10-10

## 2019-10-07 RX ADMIN — FENTANYL CITRATE 50 MCG: 50 INJECTION INTRAMUSCULAR; INTRAVENOUS at 11:54

## 2019-10-07 RX ADMIN — SODIUM CHLORIDE, POTASSIUM CHLORIDE, SODIUM LACTATE AND CALCIUM CHLORIDE 1000 ML: 600; 310; 30; 20 INJECTION, SOLUTION INTRAVENOUS at 11:04

## 2019-10-07 RX ADMIN — PROPOFOL 200 MG: 10 INJECTION, EMULSION INTRAVENOUS at 11:57

## 2019-10-07 RX ADMIN — KETOROLAC TROMETHAMINE 30 MG: 30 INJECTION, SOLUTION INTRAMUSCULAR at 12:14

## 2019-10-07 RX ADMIN — Medication 25 MG: at 12:08

## 2019-10-07 RX ADMIN — MIDAZOLAM HYDROCHLORIDE 2 MG: 1 INJECTION, SOLUTION INTRAMUSCULAR; INTRAVENOUS at 11:53

## 2019-10-07 RX ADMIN — CEFAZOLIN SODIUM 2 G: 2 SOLUTION INTRAVENOUS at 11:53

## 2019-10-07 RX ADMIN — ONDANSETRON 4 MG: 2 INJECTION INTRAMUSCULAR; INTRAVENOUS at 12:13

## 2019-10-07 RX ADMIN — FENTANYL CITRATE 50 MCG: 50 INJECTION INTRAMUSCULAR; INTRAVENOUS at 11:57

## 2019-10-07 RX ADMIN — DEXAMETHASONE SODIUM PHOSPHATE 8 MG: 4 INJECTION, SOLUTION INTRAMUSCULAR; INTRAVENOUS at 12:13

## 2019-10-07 RX ADMIN — LIDOCAINE HYDROCHLORIDE 40 MG: 20 INJECTION, SOLUTION INTRAVENOUS at 11:57

## 2019-10-07 RX ADMIN — SODIUM CHLORIDE, POTASSIUM CHLORIDE, SODIUM LACTATE AND CALCIUM CHLORIDE: 600; 310; 30; 20 INJECTION, SOLUTION INTRAVENOUS at 12:37

## 2019-10-07 NOTE — ANESTHESIA PREPROCEDURE EVALUATION
Anesthesia Evaluation     Patient summary reviewed and Nursing notes reviewed   no history of anesthetic complications:  NPO Solid Status: > 8 hours  NPO Liquid Status: > 8 hours           Airway   Mallampati: II  TM distance: >3 FB  Neck ROM: limited  Possible difficult intubation  Dental    (+) partials    Pulmonary - normal exam   (+) a smoker Current Abstained day of surgery, COPD, shortness of breath, sleep apnea on CPAP,   Cardiovascular   Exercise tolerance: good (4-7 METS)    ECG reviewed    (+) hypertension ( no meds),   CAD:  inc risk       Neuro/Psych  (+) numbness, psychiatric history Anxiety and Depression,     GI/Hepatic/Renal/Endo    (+) obesity, morbid obesity, GERD well controlled,    Diabetes:  inc risk.    Musculoskeletal     (+) arthralgias, chronic pain, myalgias,   Abdominal  - normal exam   Substance History      OB/GYN          Other   (+) arthritis     ROS/Med Hx Other: Pt non compliant with cpap  ekg sr                Anesthesia Plan    ASA 3     general     Anesthetic plan, all risks, benefits, and alternatives have been provided, discussed and informed consent has been obtained with: patient.    Plan discussed with CRNA.

## 2019-10-07 NOTE — OP NOTE
Preoperative diagnosis  Urethral stricture    Postoperative diagnosis  Name    Procedure performed  1.  Rigid cystoscopy  2.  Urethral dilation with rigid sounds  3.  Direct vision internal urethrotomy     Surgeon  Julito Chow MD    Anesthesia  General    EBL  2 mL    Complications  None    Specimen  None    Findings  Cystoscopy revealed a dense urethral stricture at the penoscrotal junction    Indications  44 y.o. male with history of obesity and hypogonadism was found to have a dense anterior urethral stricture on office cystoscopy for evaluation of weak urinary stream.  He Agreed to undergo the above named procedure after discussion of the alternatives, risks and benefits.      Procedure  The patient was taken to the operating room and placed supine on the operating table.  Pre-operative antibiotics were administered.  Bilateral lower extremity SCDs were placed.  After induction of general anesthesia the patient was positioned in dorsal lithotomy.  A time-out was performed.  An exam under anesthesia was performed with the above described findings.  The patient was prepped and draped in a sterile fashion.      A 14 [place] flexible cystoscope was placed into the anterior urethra and a sensor wire was threaded through the pinpoint urethral stricture and into the bladder.  We then had to perform meatal dilation with metal urethral sounds up to 28 [place] in order to be able to pass the DVIU scope safely.  Once safely within the urethra, we then used a sharp flat cold knife to directly incise the urethral stricture.  Once this was achieved the scope was able to be passed into the bladder and formal cystoscopy was performed, revealing no tumors or stones.  An 18 [place] tip catheter was then placed over the sensor wire with clear urine return.  10 cc were placed in the balloon, and the penis was wrapped with Yvette wrap and Coban.    Plan  The patient will follow up with me next week for Fields catheter  removal/fill and void

## 2019-10-07 NOTE — ANESTHESIA POSTPROCEDURE EVALUATION
Patient: Cooper Luu    Procedure Summary     Date:  10/07/19 Room / Location:  [facility] /  [facility] OR    Anesthesia Start:  1153 Anesthesia Stop:      Procedure:  CYSTOSCOPY DIRECT VISION URETHROTOMY  INTERNAL (N/A ) Diagnosis:       Lower urinary tract symptoms (LUTS)      (Lower urinary tract symptoms (LUTS) [R39.9])    Surgeon:  Julito Chow MD Provider:  Godwin Abreu CRNA    Anesthesia Type:  general ASA Status:  3          Anesthesia Type: general  Last vitals  BP   168/99 (10/07/19 1049)   Temp   97.4 °F (36.3 °C) (10/07/19 1049)   Pulse   75 (10/07/19 1049)   Resp   16 (10/07/19 1049)     SpO2   98 % (10/07/19 1049)     Post Anesthesia Care and Evaluation    Patient location during evaluation: PACU  Patient participation: complete - patient participated  Level of consciousness: awake  Pain score: 0  Pain management: adequate  Airway patency: patent  Anesthetic complications: No anesthetic complications  PONV Status: none  Cardiovascular status: acceptable  Respiratory status: acceptable and face mask  Hydration status: acceptable    Comments: vsss resp spont, reflexes intact, responsive, report given to pacu nurse

## 2019-10-07 NOTE — ANESTHESIA PROCEDURE NOTES
Airway  Urgency: elective    Date/Time: 10/7/2019 11:58 AM    General Information and Staff    Patient location during procedure: OR  CRNA: Godwin Abreu CRNA    Indications and Patient Condition  Indications: management.    Preoxygenated: yes  Mask difficulty assessment: 2 - vent by mask + OA or adjuvant +/- NMBA    Final Airway Details  Final airway type: supraglottic airway      Successful airway: classic  Size 5    Number of attempts at approach: 1    Additional Comments  Lma placed by standard fashion, cuff up with mov approx 30 ml bbs and expansion =, + etco, tolerated without adverse rx. Syringe to  balloon for cuff pressure equalization.

## 2019-10-07 NOTE — DISCHARGE INSTRUCTIONS
No pushing, pulling, tugging,  heavy lifting, or strenuous activity.  No major decision making, driving, or drinking alcoholic beverages for 24 hours. ( due to the medications you have  received)  Always use good hand hygiene/washing techniques.  NO driving while taking pain medications.    * if you have an incision:  Check your incision area every day for signs of infection.   Check for:  * more redness, swelling, or pain  *more fluid or blood  *warmth  *pus or bad smell    To assist you in voiding:  Drink plenty of fluids  Listen to running water while attempting to void.    If you are unable to urinate and you have an uncomfortable urge to void or it has been   6 hours since you were discharged, return to the Emergency Room              Home Care After DVIU  The following instructions will help you care for yourself, or be cared for upon your return home today.    These are guidelines for your care right after surgery only.     Diet  Drink plenty of liquids and eat light meals today.    Start your regular diet tomorrow.    Activity  Start normal activities in twenty-four (24) hours.    Wound Care and Hygiene  No restrictions, start normal routine.    Anesthesia Precautions & Expectations  After anesthesia, rest for 24 hours.    Do not drive, drink alcoholic beverages or make any important decisions during this time.  General anesthesia may cause a sore throat, jaw discomfort or muscle aches.    These symptoms can last for one or two days.     What to Expect after Surgery  Mild pain with voiding.  Frequency or urgency.  Bladder cramps.  Minimal bleeding with voiding.  It is normal to have bladder cramps with the Fields catheter in.  Empty the bag as it fills.    Call your Doctor  Passing clots in urine preventing bladder emptying  Severe pain not controlled by oral medication  Temperature above 101.5 degrees  Inability to urinate within eight (8) hours after surgery      Other Contacts  Urology Office:  [phone]   [place] [address]  [phone] office  [phone] fax      Follow up Appointment  You have a follow up scheduled with Dr. Chow on Monday, 10/14/19 at 11 am

## 2019-10-08 PROBLEM — N35.919 STRICTURE OF MALE URETHRA: Status: ACTIVE | Noted: 2019-10-08

## 2019-10-11 ENCOUNTER — OFFICE VISIT (OUTPATIENT)
Dept: UROLOGY | Facility: CLINIC | Age: 44
End: 2019-10-11

## 2019-10-11 DIAGNOSIS — N35.919 STRICTURE OF MALE URETHRA, UNSPECIFIED STRICTURE TYPE: Primary | ICD-10-CM

## 2019-10-11 DIAGNOSIS — R33.9 URINARY RETENTION: ICD-10-CM

## 2019-10-11 PROCEDURE — 51798 US URINE CAPACITY MEASURE: CPT | Performed by: UROLOGY

## 2019-10-11 NOTE — PROGRESS NOTES
Patient was seen today for a fill and void.  He was able to void well with a postvoid residual of 41.  He will follow-up with me in 4 months for his next Testopel, with his testosterone labs performed at 2 months.  We will also discuss his urinary symptoms at that time.

## 2019-11-20 ENCOUNTER — FLU SHOT (OUTPATIENT)
Dept: INTERNAL MEDICINE | Facility: CLINIC | Age: 44
End: 2019-11-20

## 2019-11-20 DIAGNOSIS — Z23 NEED FOR INFLUENZA VACCINATION: Primary | ICD-10-CM

## 2019-11-20 PROCEDURE — 90674 CCIIV4 VAC NO PRSV 0.5 ML IM: CPT | Performed by: INTERNAL MEDICINE

## 2019-11-20 PROCEDURE — 90471 IMMUNIZATION ADMIN: CPT | Performed by: INTERNAL MEDICINE

## 2019-11-26 DIAGNOSIS — R79.89 LOW TESTOSTERONE: Primary | ICD-10-CM

## 2019-11-27 LAB — TESTOST SERPL-MCNC: 418 NG/DL (ref 264–916)

## 2019-12-03 ENCOUNTER — OFFICE VISIT (OUTPATIENT)
Dept: INTERNAL MEDICINE | Facility: CLINIC | Age: 44
End: 2019-12-03

## 2019-12-03 VITALS
DIASTOLIC BLOOD PRESSURE: 86 MMHG | BODY MASS INDEX: 39.17 KG/M2 | TEMPERATURE: 98.4 F | WEIGHT: 315 LBS | SYSTOLIC BLOOD PRESSURE: 150 MMHG | HEART RATE: 92 BPM | OXYGEN SATURATION: 100 % | HEIGHT: 75 IN

## 2019-12-03 DIAGNOSIS — Z23 NEED FOR HEPATITIS A IMMUNIZATION: ICD-10-CM

## 2019-12-03 DIAGNOSIS — Z23 NEED FOR PNEUMOCOCCAL VACCINE: ICD-10-CM

## 2019-12-03 DIAGNOSIS — M51.26 HERNIATED LUMBAR INTERVERTEBRAL DISC: ICD-10-CM

## 2019-12-03 DIAGNOSIS — Z00.00 PREVENTATIVE HEALTH CARE: ICD-10-CM

## 2019-12-03 DIAGNOSIS — Z23 NEED FOR TDAP VACCINATION: ICD-10-CM

## 2019-12-03 DIAGNOSIS — M54.16 LUMBAR BACK PAIN WITH RADICULOPATHY AFFECTING LEFT LOWER EXTREMITY: ICD-10-CM

## 2019-12-03 DIAGNOSIS — E66.01 CLASS 3 SEVERE OBESITY DUE TO EXCESS CALORIES WITH SERIOUS COMORBIDITY AND BODY MASS INDEX (BMI) OF 45.0 TO 49.9 IN ADULT (HCC): ICD-10-CM

## 2019-12-03 DIAGNOSIS — M54.16 LUMBAR BACK PAIN WITH RADICULOPATHY AFFECTING RIGHT LOWER EXTREMITY: ICD-10-CM

## 2019-12-03 DIAGNOSIS — Z00.00 ANNUAL PHYSICAL EXAM: Primary | ICD-10-CM

## 2019-12-03 PROCEDURE — 99396 PREV VISIT EST AGE 40-64: CPT | Performed by: PHYSICIAN ASSISTANT

## 2019-12-03 PROCEDURE — 90471 IMMUNIZATION ADMIN: CPT | Performed by: PHYSICIAN ASSISTANT

## 2019-12-03 PROCEDURE — 90632 HEPA VACCINE ADULT IM: CPT | Performed by: PHYSICIAN ASSISTANT

## 2019-12-03 PROCEDURE — 90715 TDAP VACCINE 7 YRS/> IM: CPT | Performed by: PHYSICIAN ASSISTANT

## 2019-12-03 PROCEDURE — 90472 IMMUNIZATION ADMIN EACH ADD: CPT | Performed by: PHYSICIAN ASSISTANT

## 2019-12-03 PROCEDURE — 90732 PPSV23 VACC 2 YRS+ SUBQ/IM: CPT | Performed by: PHYSICIAN ASSISTANT

## 2019-12-03 RX ORDER — CYCLOBENZAPRINE HCL 10 MG
10 TABLET ORAL NIGHTLY PRN
Qty: 30 TABLET | Refills: 0 | Status: SHIPPED | OUTPATIENT
Start: 2019-12-03 | End: 2020-09-24

## 2019-12-03 NOTE — PROGRESS NOTES
"Subjective   Cooper Luu is a 44 y.o. male and is here for a comprehensive physical exam. The patient reports problems - back pain.    HPI: Patient with history significant for lumbar degenerative disc disease, herniated lumbar disc and past lumbar surgery is concerned today with sharp pains in the hips and buttocks which are particularly bothersome at night while trying to sleep.  Legs have been restless which also causes sleep disturbances.  The pain is a constant dull ache with intermittent extreme sharp pain.  The pain alternates sides without known cause but tends to effect the right side more often. He saw Dr. Petersen in 2017 after MRI at which time surgery was discussed but declined by the patient.  Symptoms failed to improve with oral corticosteroids or epidural steroid block so he was prescribed gabapentin 300 mg 3 times daily.  He has an old prescription of gabapentin which he has tried without any relief. Numbness of things has been persistent and unchanged since his first surgery.       Lumbar MRI from 8/3/17  \"IMPRESSION:  1. Multilevel degenerative change within the lumbar spine most  pronounced at the L5/S1 level where a broad-based disc bulge with  superimposed disc herniation produces moderate central stenosis,  moderate to severe left neural foraminal narrowing and moderate right  neural foraminal narrowing.  2. Partially visualized disc extrusion at the T11/12 level producing  moderate central stenosis in the visualized portions of the extrusion. A  dedicated MRI of the thoracic spine is recommended.\"        Blood pressure elevation today, likely due to pain.Patient denies chest pain, dyspnea, orthopnea, palpitations, lower extremity edema, headaches, weakness, visual disturbances or confusion.           Do you take any herbs or supplements that were not prescribed by a doctor? yes, vitamin d  Are you taking calcium supplements? No  Are you taking aspirin daily? No     History:  Patient " receives prostate care here: No  Date last prostate exam: 2019  Date last PSA: 8/2019  He reports No decrease in urinary stream (much improved),  Some nocturia (0-1x; improved from 4-5x, No dribbling, No hesitancy.    Family history of prostate cancer: no.    Sexual activity questions deferred to the physician.    The following portions of the patient's history were reviewed and updated as appropriate: He  has a past medical history of Anxiety, Arthritis, Bell's palsy, Cystic acne, Eczema, GERD (gastroesophageal reflux disease), History of fracture, History of UTI (1997), Infection, skin, staph, Injury of back, Low testosterone, Sinus problem, Sleep apnea, Tattoos, Wears glasses, and Wears partial dentures.  He does not have any pertinent problems on file.  He  has a past surgical history that includes Cervical discectomy; Cholecystectomy; Fracture surgery (Left); Back surgery; Tonsillectomy; and cystoscopy urethrotomy visual internal (N/A, 10/7/2019).  His family history is not on file.  He  reports that he has been smoking cigarettes.  He has a 17.25 pack-year smoking history. He has never used smokeless tobacco. He reports that he drinks alcohol. He reports that he does not use drugs.  Current Outpatient Medications   Medication Sig Dispense Refill   • VITAMIN D, CHOLECALCIFEROL, PO Take 2,000 Units by mouth.     • busPIRone (BUSPAR) 10 MG tablet Take 1 tablet by mouth 3 (Three) Times a Day As Needed (anxiety). 90 tablet 5   • ciprofloxacin (CIPRO) 500 MG tablet Take 1 tablet by mouth 2 (Two) Times a Day. 6 tablet 0   • cyclobenzaprine (FLEXERIL) 10 MG tablet Take 1 tablet by mouth At Night As Needed for Muscle Spasms. 30 tablet 0   • docusate sodium (COLACE) 100 MG capsule Take 1 capsule by mouth 2 (Two) Times a Day. If taking percocet 15 capsule 1   • naproxen sodium (ALEVE) 220 MG tablet Take 220 mg by mouth 2 (Two) Times a Day As Needed for Mild Pain .     • oxybutynin XL (DITROPAN XL) 10 MG 24 hr tablet Take  1 tablet by mouth Daily As Needed for bladder spasms 10 tablet 0   • oxyCODONE (ROXICODONE) 5 MG immediate release tablet Take 1 tablet by mouth Every 6 (Six) Hours As Needed for Moderate Pain or Severe Pain . 5 tablet 0   • triamcinolone (KENALOG) 0.1 % lotion Apply  topically to the appropriate area as directed 2 (Two) Times a Day As Needed (hand eczema). (Patient taking differently: Apply 1 application topically to the appropriate area as directed 2 (Two) Times a Day As Needed for Irritation (hand eczema).) 60 mL 0     No current facility-administered medications for this visit.        Review of Systems  Do you have pain that bothers you in your daily life? yes    Review of Systems   Constitutional: Positive for fatigue (chronic). Negative for appetite change, chills, fever and unexpected weight change.   HENT: Negative for congestion, ear pain, hearing loss, nosebleeds, postnasal drip, rhinorrhea, sore throat, tinnitus and trouble swallowing.    Eyes: Negative for photophobia, discharge and visual disturbance.   Respiratory: Negative for cough, chest tightness, shortness of breath and wheezing.    Cardiovascular: Negative for chest pain, palpitations and leg swelling.   Gastrointestinal: Negative for abdominal distention, abdominal pain, blood in stool, constipation, diarrhea, nausea and vomiting.   Endocrine: Negative for cold intolerance, heat intolerance, polydipsia, polyphagia and polyuria.   Genitourinary: Negative.    Musculoskeletal: Positive for arthralgias, back pain and myalgias. Negative for joint swelling, neck pain and neck stiffness.   Skin: Negative for color change, pallor, rash and wound.   Allergic/Immunologic: Negative for environmental allergies, food allergies and immunocompromised state.   Neurological: Positive for numbness (thighs). Negative for dizziness, tremors, seizures, syncope, weakness and headaches.        Restless legs.    Hematological: Negative for adenopathy. Does not  "bruise/bleed easily.   Psychiatric/Behavioral: Negative for agitation, behavioral problems, confusion, dysphoric mood, hallucinations, self-injury and suicidal ideas. The patient is not nervous/anxious.          Objective   /86   Pulse 92   Temp 98.4 °F (36.9 °C)   Ht 190.5 cm (75\")   Wt (!) 168 kg (370 lb)   SpO2 100%   BMI 46.25 kg/m²     Physical Exam   Constitutional: He is oriented to person, place, and time. He appears well-developed and well-nourished. No distress.   Obese.   HENT:   Head: Normocephalic and atraumatic.   Right Ear: External ear normal.   Left Ear: External ear normal.   Nose: Nose normal.   Mouth/Throat: Oropharynx is clear and moist. No oropharyngeal exudate.   Eyes: Conjunctivae and EOM are normal. Pupils are equal, round, and reactive to light. No scleral icterus.   Neck: Normal range of motion. Neck supple. No thyromegaly present.   Cardiovascular: Normal rate, regular rhythm, normal heart sounds and intact distal pulses. Exam reveals no gallop and no friction rub.   No murmur heard.  Pulmonary/Chest: Effort normal and breath sounds normal. No stridor. No respiratory distress. He has no wheezes. He has no rales. He exhibits no tenderness.   Abdominal: Soft. Bowel sounds are normal. He exhibits no distension and no mass. There is no tenderness. There is no rebound and no guarding. No hernia.   Musculoskeletal: Normal range of motion. He exhibits tenderness (lumbar vertebral). He exhibits no deformity.   Lymphadenopathy:     He has no cervical adenopathy.   Neurological: He is alert and oriented to person, place, and time. He displays normal reflexes. No cranial nerve deficit or sensory deficit.   Skin: Skin is warm and dry. Capillary refill takes less than 2 seconds. No rash noted. He is not diaphoretic. No pallor.   Psychiatric: He has a normal mood and affect. His behavior is normal. Judgment and thought content normal.   Nursing note and vitals reviewed.       "   Assessment/Plan   Healthy male exam.     1.    Diagnosis Plan   1. Annual physical exam     2. Class 3 severe obesity due to excess calories with serious comorbidity and body mass index (BMI) of 45.0 to 49.9 in adult (CMS/[facility])  Patient's Body mass index is 46.25 kg/m². BMI is above normal parameters. Recommendations include: exercise counseling and nutrition counseling.     3. Herniated lumbar intervertebral disc  MRI Lumbar Spine Without Contrast   4. Lumbar back pain with radiculopathy affecting right lower extremity  MRI Lumbar Spine Without Contrast   5. Lumbar back pain with radiculopathy affecting left lower extremity  MRI Lumbar Spine Without Contrast   6. Need for hepatitis A immunization  Hepatitis A Vaccine Adult IM   7. Need for Tdap vaccination  Tdap Vaccine Greater Than or Equal To 8yo IM   8. Need for pneumococcal vaccine  Pneumococcal Polysaccharide Vaccine 23-Valent Greater Than or Equal To 3yo Subcutaneous / IM   9. Preventative health care  Lipid Panel       2. Patient Counseling:  --Nutrition: Stressed importance of moderation in sodium/caffeine intake, saturated fat and cholesterol, caloric balance, sufficient intake of fresh fruits, vegetables, fiber, calcium and iron.  --Discussed the issue of calcium supplement, and the daily use of baby aspirin if applicable.  --Exercise: Stressed the importance of regular exercise.   --Substance Abuse: Discussed cessation/primary prevention of tobacco (if applicable, alcohol, or other drug use (if applicable); driving or other dangerous activities under the influence; availability of treatment for abuse.    --Sexuality: Discussed sexually transmitted diseases, partner selection, use of condoms, avoidance of unintended pregnancy  and contraceptive alternatives.   --Injury prevention: Discussed safety belts, safety helmets, smoke detector, smoking near bedding or upholstery.   --Dental health: Discussed importance of regular tooth brushing, flossing, and  dental visits.  --Immunizations reviewed.  --Discussed benefits of screening colonoscopy (if applicable).  --After hours service discussed with patient.    3. Discussed the patient's BMI with him.  The BMI is above average; BMI management plan is completed  4. No Follow-up on file.       FÉLIX Gaming  12/03/2019  2:03 PM

## 2019-12-03 NOTE — PROGRESS NOTES
"Cooper Luu is a 44 y.o. male.     Subjective   History of Present Illness   Patient with history significant for lumbar degenerative disc disease, herniated lumbar disc and past lumbar surgery is here today with concern of sharp pains in the hips and buttocks which are particularly bothersome at night while trying to sleep.  The pain is a constant dull ache with intermittent extreme sharp pain.  The pain alternates sides without known cause.      Lumbar MRI from 8/3/17  \"IMPRESSION:  1. Multilevel degenerative change within the lumbar spine most  pronounced at the L5/S1 level where a broad-based disc bulge with  superimposed disc herniation produces moderate central stenosis,  moderate to severe left neural foraminal narrowing and moderate right  neural foraminal narrowing.  2. Partially visualized disc extrusion at the T11/12 level producing  moderate central stenosis in the visualized portions of the extrusion. A  dedicated MRI of the thoracic spine is recommended.\"    He saw Dr. Petersen in 2017 after MRI at which time surgery was discussed but declined by the patient.  Symptoms failed to improve with oral corticosteroids or epidural steroid block so he was prescribed gabapentin 300 mg 3 times daily.        The following portions of the patient's history were reviewed and updated as appropriate: allergies, current medications, past family history, past medical history, past social history, past surgical history and problem list.    Review of Systems      Objective    Physical Exam      There were no vitals taken for this visit.    Nursing note and vitals reviewed.          Assessment/Plan   {Assess/PlanSmartLinks:00084}             "

## 2019-12-09 DIAGNOSIS — M25.551 RIGHT HIP PAIN: Primary | ICD-10-CM

## 2019-12-11 ENCOUNTER — HOSPITAL ENCOUNTER (OUTPATIENT)
Dept: MRI IMAGING | Facility: HOSPITAL | Age: 44
Discharge: HOME | End: 2019-12-11
Admitting: PHYSICIAN ASSISTANT

## 2019-12-11 PROCEDURE — 72148 MRI LUMBAR SPINE W/O DYE: CPT

## 2019-12-12 ENCOUNTER — APPOINTMENT (OUTPATIENT)
Dept: MRI IMAGING | Facility: HOSPITAL | Age: 44
End: 2019-12-12

## 2019-12-12 DIAGNOSIS — M48.062 SPINAL STENOSIS OF LUMBAR REGION WITH NEUROGENIC CLAUDICATION: ICD-10-CM

## 2019-12-12 DIAGNOSIS — M54.16 LUMBAR BACK PAIN WITH RADICULOPATHY AFFECTING RIGHT LOWER EXTREMITY: ICD-10-CM

## 2019-12-12 DIAGNOSIS — M51.26 HERNIATED LUMBAR INTERVERTEBRAL DISC: ICD-10-CM

## 2019-12-12 DIAGNOSIS — M54.16 LUMBAR BACK PAIN WITH RADICULOPATHY AFFECTING LEFT LOWER EXTREMITY: Primary | ICD-10-CM

## 2019-12-12 DIAGNOSIS — M25.551 RIGHT HIP PAIN: ICD-10-CM

## 2020-01-30 ENCOUNTER — PROCEDURE VISIT (OUTPATIENT)
Dept: UROLOGY | Facility: CLINIC | Age: 45
End: 2020-01-30

## 2020-01-30 VITALS — BODY MASS INDEX: 39.17 KG/M2 | WEIGHT: 315 LBS | HEIGHT: 75 IN | TEMPERATURE: 97.8 F | OXYGEN SATURATION: 18 %

## 2020-01-30 DIAGNOSIS — R79.89 LOW TESTOSTERONE: Primary | ICD-10-CM

## 2020-01-30 PROCEDURE — 99214 OFFICE O/P EST MOD 30 MIN: CPT | Performed by: UROLOGY

## 2020-01-30 RX ORDER — TESTOSTERONE CYPIONATE 100 MG/ML
150 INJECTION, SOLUTION INTRAMUSCULAR WEEKLY
Qty: 10 ML | Refills: 3 | Status: SHIPPED | OUTPATIENT
Start: 2020-01-30 | End: 2020-07-30 | Stop reason: SDUPTHER

## 2020-01-30 RX ORDER — BLOOD PRESSURE TEST KIT
KIT MISCELLANEOUS
Qty: 100 EACH | Refills: 11 | OUTPATIENT
Start: 2020-01-30 | End: 2020-12-07

## 2020-01-30 RX ORDER — SILDENAFIL CITRATE 20 MG/1
100 TABLET ORAL DAILY PRN
Qty: 60 TABLET | Refills: 11 | Status: SHIPPED | OUTPATIENT
Start: 2020-01-30 | End: 2020-12-18

## 2020-01-30 NOTE — PROGRESS NOTES
Chief Complaint  LUTS  Low testosterone  Erectile dysfunction      HPI  Mr. Luu is a 45 y.o. male former [employer], deployed in [place] with history of obesity who presents with low testosterone, lower urinary tract symptoms.    He presents today to discuss management of his TRT.  He can no longer afford Testopel.    He says his lower urinary tract symptoms are greatly improved since DVIU, and is now able to sleep through the night.  He is very happy with this.  He complains of erectile dysfunction, which has been worsening over the past 6 months.  He has had an EKG in the past year.  He denies any chest pain or shortness of breath.  His erections are less than 5 out of 10, and have responded well to PDE 5 inhibitors in the past, when he was given some samples.    For historical review,  Primary symptom includes: Decreased libido, nonexistent energy, difficulty sleeping, mental clarity issues.  He is mostly concerned about low testosterone today.  His main urinary issues are daily urinary frequency, nocturia, weak stream.  Patient denies dysuria or hematuria.  Onset was many years ago in 2012 ago.  He is unsure if there is a temporal relationship between his deployment and periods of extreme stress with his development of low testosterone.  He denies any past anabolic steroid or illicit testosterone usage.  He does admit to a history of back injury, with surgeries, nerve impingement, and  orthopedic hardware.  He says that if he sits for too long his leg becomes numb.  He admits to weight gain and decrease in size of penis.  He has had multiple issues with with recurrent skin infections and cellulitis of the lower extremities and groin in the past.  Previous treatments include: AndroGel, testosterone cypionate injections      Past Medical History  Past Medical History:   Diagnosis Date   • Anxiety    • Arthritis     Bothersome in knees and back    • Bell's palsy     Reported in 1986 and then again in 2001.  Reported he  has residual weakness left side of face.    • Cystic acne    • Eczema    • GERD (gastroesophageal reflux disease)     Intermittent episodes   • History of fracture     Left tibia, left ankle (required surgical intervention)   • History of UTI 1997   • Infection, skin, staph     Patient reported history of several epsidoes but reported was not diagnosed as having MRSA   • Injury of back    • Low testosterone    • Sinus problem     Reported noted in the spring time   • Sleep apnea     Patient reported diagnosed around 2004 and was not Rx a CPAP.  Reported he has not had any other sleep studies since that time.    • Tattoos    • Wears glasses    • Wears partial dentures     Full upper plate - instructed no adhesives the DOS       Past Surgical History  Past Surgical History:   Procedure Laterality Date   • BACK SURGERY      x1 lower back   • CERVICAL DISCECTOMY ANTERIOR      x1   • CHOLECYSTECTOMY     • CYSTOSCOPY URETHROTOMY VISUAL INTERNAL N/A 10/7/2019    Procedure: CYSTOSCOPY DIRECT VISION URETHROTOMY  INTERNAL;  Surgeon: Julito Chow MD;  [facility];  Service: Urology   • FRACTURE SURGERY Left     Tibia and ankle - required surgical intervention with placement of hardware that was later removed   • TONSILLECTOMY         Medications    Current Outpatient Medications:   •  busPIRone (BUSPAR) 10 MG tablet, Take 1 tablet by mouth 3 (Three) Times a Day As Needed (anxiety)., Disp: 90 tablet, Rfl: 5  •  ciprofloxacin (CIPRO) 500 MG tablet, Take 1 tablet by mouth 2 (Two) Times a Day., Disp: 6 tablet, Rfl: 0  •  cyclobenzaprine (FLEXERIL) 10 MG tablet, Take 1 tablet by mouth At Night As Needed for Muscle Spasms., Disp: 30 tablet, Rfl: 0  •  docusate sodium (COLACE) 100 MG capsule, Take 1 capsule by mouth 2 (Two) Times a Day. If taking percocet, Disp: 15 capsule, Rfl: 1  •  naproxen sodium (ALEVE) 220 MG tablet, Take 220 mg by mouth 2 (Two) Times a Day As Needed for Mild Pain ., Disp: , Rfl:   •   oxybutynin XL (DITROPAN XL) 10 MG 24 hr tablet, Take 1 tablet by mouth Daily As Needed for bladder spasms, Disp: 10 tablet, Rfl: 0  •  oxyCODONE (ROXICODONE) 5 MG immediate release tablet, Take 1 tablet by mouth Every 6 (Six) Hours As Needed for Moderate Pain or Severe Pain ., Disp: 5 tablet, Rfl: 0  •  triamcinolone (KENALOG) 0.1 % lotion, Apply  topically to the appropriate area as directed 2 (Two) Times a Day As Needed (hand eczema). (Patient taking differently: Apply 1 application topically to the appropriate area as directed 2 (Two) Times a Day As Needed for Irritation (hand eczema).), Disp: 60 mL, Rfl: 0  •  VITAMIN D, CHOLECALCIFEROL, PO, Take 2,000 Units by mouth., Disp: , Rfl:     Allergies  Allergies   Allergen Reactions   • Hydrocodone Hives, Itching, Nausea Only and Rash   • Sulfa Antibiotics Itching and Rash       Social History  Social History     Socioeconomic History   • Marital status:      Spouse name: Not on file   • Number of children: Not on file   • Years of education: Not on file   • Highest education level: Not on file   Tobacco Use   • Smoking status: Current Every Day Smoker     Packs/day: 0.75     Years: 23.00     Pack years: 17.25     Types: Cigarettes   • Smokeless tobacco: Never Used   Substance and Sexual Activity   • Alcohol use: Yes     Comment: Rare use, reported no history of abuse   • Drug use: No   • Sexual activity: Defer       Family History  He has no family history of prostate cancer    Review of Systems  Constitutional: No fevers or chills; positive decreased energy  Skin: Positive for multiple ingrown hairs and boils of lower extremity  Endocrine: No heat/cold intolerance   Cardiovascular: Negative for chest pain or dyspnea on exertion  Respiratory: Negative for shortness of breath or wheezing  Gastrointestinal: No nausea or vomiting  Genitourinary: Negative for current gross hematuria.  Musculoskeletal: No flank pain; positive intermittent low back  "pain  Neurological:  Negative for frequent headaches or dizziness  Lymph/Heme: Negative for leg swelling or calf pain.    Physical Exam  Visit Vitals  Temp 97.8 °F (36.6 °C)   Ht 190.5 cm (75\")   Wt (!) 168 kg (370 lb)   SpO2 (!) 18%   BMI 46.25 kg/m²     Constitutional: NAD, WDWN.   HEENT: NCAT. Conjunctivae normal.  MMM.    Cardiovascular: Regular rate.  Pulmonary/Chest: Respirations are even and non-labored bilaterally.  Abdominal: Soft. No distension, tenderness, masses or guarding. No CVA tenderness.  Neurological: A + O x 3.  Cranial Nerves II-XII grossly intact. Normal gait.  Extremities: TISHA x 4, Warm. No clubbing.  No cyanosis.    Skin: Pink, warm and dry.  Multiple ingrown hairs and excoriations from past follicular infections  Psychiatric:  Normal mood and affect    Genitourinary  Penis: circumcised partially buried penis, glans normal, no penile discharge.  No rashes/lesions.    Testes: descended bilaterally, no masses, nontender to palpation. Remainder of scrotal contents normal. No hernia appreciated.  Rectal:  Normal tone, no masses.      Labs  Lab Results   Component Value Date    PSA 0.497 08/27/2019    PSA 0.500 05/24/2018       Brief Urine Lab Results  (Last result in the past 365 days)      Color   Clarity   Blood   Leuk Est   Nitrite   Protein   CREAT   Urine HCG        09/30/19 0849 Yellow Clear Negative Small (1+) Negative Negative             Results for BEATRIZ SOUSA (MRN [id number]) as of 9/16/2019 08:44   Ref. Range 8/27/2019 08:31   Prolactin Latest Ref Range: 4.0 - 15.2 ng/mL 8.7   Estradiol Latest Ref Range: 7.6 - 42.6 pg/mL 27.0   Sex Hormone Binding Globulin Latest Ref Range: 16.5 - 55.9 nmol/L 18.0   Testosterone, Total Latest Ref Range: 264 - 916 ng/dL 173 (L)   Testosterone, Free Latest Ref Range: 6.8 - 21.5 pg/mL 8.2   Hemoglobin Latest Ref Range: 13.0 - 17.7 g/dL 15.4   Hematocrit Latest Ref Range: 37.5 - 51.0 % 48.1     He was not able to void for a uroflow, so a repeat " postvoid residual was performed, which was done for evaluation with his new urethral stricture diagnosis.      Assessment  Mr. Luu is a 45 y.o. male who presents with LUTS, primarily weak stream, urinary frequency, nocturia, as well as low libido and low energy, low testosterone.  He is status post DVIU with great resolution of his urinary symptoms.    He wishes to switch from Testopel to weekly self injections.      Plan  1.  Start weekly testosterone cypionate injections 150mg; nursing staff reviewed proper injection technique, and he has done this in the past.  2.  Labs in 6 months  3.  Prescription for sildenafil in good Rx card given    I spent a total of 25 minutes with the patient in face-to-face interaction, with >50% of the time spent in engaging in counseling on the risks, benefits, and alternatives of the therapy and coordinating care.       Julito Chow MD

## 2020-02-03 DIAGNOSIS — N52.9 ERECTILE DYSFUNCTION, UNSPECIFIED ERECTILE DYSFUNCTION TYPE: Primary | ICD-10-CM

## 2020-02-04 RX ORDER — TADALAFIL 5 MG/1
5 TABLET ORAL DAILY
Qty: 90 TABLET | Refills: 3 | Status: SHIPPED | OUTPATIENT
Start: 2020-02-04 | End: 2020-07-30 | Stop reason: SDUPTHER

## 2020-05-06 RX ORDER — DOXYCYCLINE HYCLATE 100 MG/1
100 CAPSULE ORAL 2 TIMES DAILY
Qty: 20 CAPSULE | Refills: 0 | Status: SHIPPED | OUTPATIENT
Start: 2020-05-06 | End: 2020-05-16

## 2020-05-06 RX ORDER — DOXYCYCLINE HYCLATE 100 MG/1
100 CAPSULE ORAL 2 TIMES DAILY
Qty: 20 CAPSULE | Refills: 0 | Status: SHIPPED | OUTPATIENT
Start: 2020-05-06 | End: 2020-05-06 | Stop reason: SDUPTHER

## 2020-05-13 DIAGNOSIS — L73.2 HYDRADENITIS: Primary | ICD-10-CM

## 2020-05-13 RX ORDER — CHLORHEXIDINE GLUCONATE 4 G/100ML
SOLUTION TOPICAL DAILY PRN
Qty: 3780 ML | Refills: 5 | OUTPATIENT
Start: 2020-05-13 | End: 2020-12-07

## 2020-05-13 RX ORDER — CLINDAMYCIN PHOSPHATE 11.9 MG/ML
SOLUTION TOPICAL 2 TIMES DAILY
Qty: 60 ML | Refills: 5 | OUTPATIENT
Start: 2020-05-13 | End: 2020-12-07

## 2020-07-24 ENCOUNTER — LAB (OUTPATIENT)
Dept: LAB | Facility: HOSPITAL | Age: 45
End: 2020-07-24

## 2020-07-24 DIAGNOSIS — R79.89 LOW TESTOSTERONE: ICD-10-CM

## 2020-07-24 LAB
ESTRADIOL SERPL HS-MCNC: 49.5 PG/ML
HCT VFR BLD AUTO: 49.2 % (ref 37.5–51)
HGB BLD-MCNC: 17.4 G/DL (ref 13–17.7)

## 2020-07-24 PROCEDURE — 85014 HEMATOCRIT: CPT

## 2020-07-24 PROCEDURE — 36415 COLL VENOUS BLD VENIPUNCTURE: CPT

## 2020-07-24 PROCEDURE — 82670 ASSAY OF TOTAL ESTRADIOL: CPT

## 2020-07-24 PROCEDURE — 84402 ASSAY OF FREE TESTOSTERONE: CPT

## 2020-07-24 PROCEDURE — 84270 ASSAY OF SEX HORMONE GLOBUL: CPT

## 2020-07-24 PROCEDURE — 84403 ASSAY OF TOTAL TESTOSTERONE: CPT

## 2020-07-24 PROCEDURE — 85018 HEMOGLOBIN: CPT

## 2020-07-27 LAB
SHBG SERPL-SCNC: 14 NMOL/L (ref 16.5–55.9)
TESTOST FREE SERPL-MCNC: 13.8 PG/ML (ref 6.8–21.5)
TESTOST SERPL-MCNC: 465 NG/DL (ref 264–916)

## 2020-07-30 ENCOUNTER — OFFICE VISIT (OUTPATIENT)
Dept: UROLOGY | Facility: CLINIC | Age: 45
End: 2020-07-30

## 2020-07-30 VITALS — WEIGHT: 315 LBS | BODY MASS INDEX: 39.17 KG/M2 | TEMPERATURE: 98 F | HEIGHT: 75 IN

## 2020-07-30 DIAGNOSIS — N52.9 ERECTILE DYSFUNCTION, UNSPECIFIED ERECTILE DYSFUNCTION TYPE: ICD-10-CM

## 2020-07-30 DIAGNOSIS — R79.89 LOW TESTOSTERONE: ICD-10-CM

## 2020-07-30 PROCEDURE — 99214 OFFICE O/P EST MOD 30 MIN: CPT | Performed by: UROLOGY

## 2020-07-30 RX ORDER — TADALAFIL 5 MG/1
5 TABLET ORAL DAILY
Qty: 90 TABLET | Refills: 3 | Status: SHIPPED | OUTPATIENT
Start: 2020-07-30 | End: 2021-02-15 | Stop reason: SDUPTHER

## 2020-07-30 RX ORDER — TESTOSTERONE CYPIONATE 100 MG/ML
150 INJECTION, SOLUTION INTRAMUSCULAR WEEKLY
Qty: 10 ML | Refills: 3 | Status: SHIPPED | OUTPATIENT
Start: 2020-07-30 | End: 2021-02-15 | Stop reason: SDUPTHER

## 2020-07-30 NOTE — PROGRESS NOTES
Chief Complaint  LUTS  Low testosterone  Erectile dysfunction      HPI  Mr. Luu is a 45 y.o. male former [employer], deployed in [place] with history of obesity who presents with low testosterone, lower urinary tract symptoms.    He presents today to discuss management of his TRT.    He says his lower urinary tract symptoms are greatly improved since DVIU, and is now able to sleep through the night.  He is very happy with this.  He says that the 5 mg daily tadalafil works well for him for erectile dysfunction.  He is also happy with the results of her current dosing of testosterone, however the needle scare him.  He denies any chest or leg swelling or increased acne or shortness of breath.    For historical review,  Primary symptom includes: Decreased libido, nonexistent energy, difficulty sleeping, mental clarity issues.  He is mostly concerned about low testosterone today.  His main urinary issues are daily urinary frequency, nocturia, weak stream.  Patient denies dysuria or hematuria.  Onset was many years ago in 2012 ago.  He is unsure if there is a temporal relationship between his deployment and periods of extreme stress with his development of low testosterone.  He denies any past anabolic steroid or illicit testosterone usage.  He does admit to a history of back injury, with surgeries, nerve impingement, and  orthopedic hardware.  He says that if he sits for too long his leg becomes numb.  He admits to weight gain and decrease in size of penis.  He has had multiple issues with with recurrent skin infections and cellulitis of the lower extremities and groin in the past.  Previous treatments include: AndroGel, testosterone cypionate injections      Past Medical History  Past Medical History:   Diagnosis Date   • Anxiety    • Arthritis     Bothersome in knees and back    • Bell's palsy     Reported in 1986 and then again in 2001.  Reported he has residual weakness left side of face.    • Cystic acne    • Eczema     • GERD (gastroesophageal reflux disease)     Intermittent episodes   • History of fracture     Left tibia, left ankle (required surgical intervention)   • History of UTI 1997   • Infection, skin, staph     Patient reported history of several epsidoes but reported was not diagnosed as having MRSA   • Injury of back    • Low testosterone    • Sinus problem     Reported noted in the spring time   • Sleep apnea     Patient reported diagnosed around 2004 and was not Rx a CPAP.  Reported he has not had any other sleep studies since that time.    • Tattoos    • Wears glasses    • Wears partial dentures     Full upper plate - instructed no adhesives the DOS       Past Surgical History  Past Surgical History:   Procedure Laterality Date   • BACK SURGERY      x1 lower back   • CERVICAL DISCECTOMY ANTERIOR      x1   • CHOLECYSTECTOMY     • CYSTOSCOPY URETHROTOMY VISUAL INTERNAL N/A 10/7/2019    Procedure: CYSTOSCOPY DIRECT VISION URETHROTOMY  INTERNAL;  Surgeon: Julito Chow MD;  Location: [facility];  Service: Urology   • FRACTURE SURGERY Left     Tibia and ankle - required surgical intervention with placement of hardware that was later removed   • TONSILLECTOMY         Medications    Current Outpatient Medications:   •  Alcohol Swabs pads, Clean area prior to injection, Disp: 100 each, Rfl: 11  •  busPIRone (BUSPAR) 10 MG tablet, Take 1 tablet by mouth 3 (Three) Times a Day As Needed (anxiety)., Disp: 90 tablet, Rfl: 5  •  chlorhexidine (HIBICLENS) 4 % external liquid, Apply  topically to the appropriate area as directed Daily As Needed for Wound Care., Disp: 3780 mL, Rfl: 5  •  ciprofloxacin (CIPRO) 500 MG tablet, Take 1 tablet by mouth 2 (Two) Times a Day., Disp: 6 tablet, Rfl: 0  •  clindamycin (Cleocin-T) 1 % external solution, Apply  topically to the appropriate area as directed 2 (Two) Times a Day., Disp: 60 mL, Rfl: 5  •  cyclobenzaprine (FLEXERIL) 10 MG tablet, Take 1 tablet by mouth At Night As Needed for  "Muscle Spasms., Disp: 30 tablet, Rfl: 0  •  docusate sodium (COLACE) 100 MG capsule, Take 1 capsule by mouth 2 (Two) Times a Day. If taking percocet, Disp: 15 capsule, Rfl: 1  •  naproxen sodium (ALEVE) 220 MG tablet, Take 220 mg by mouth 2 (Two) Times a Day As Needed for Mild Pain ., Disp: , Rfl:   •  Needle, Disp, 18G X 1-1/2\" misc, Use for drawing up the medication, Disp: 50 each, Rfl: 3  •  Needle, Disp, 23G X 1/2\" misc, Use for injecting the medication, Disp: 100 each, Rfl: 3  •  oxybutynin XL (DITROPAN XL) 10 MG 24 hr tablet, Take 1 tablet by mouth Daily As Needed for bladder spasms, Disp: 10 tablet, Rfl: 0  •  oxyCODONE (ROXICODONE) 5 MG immediate release tablet, Take 1 tablet by mouth Every 6 (Six) Hours As Needed for Moderate Pain or Severe Pain ., Disp: 5 tablet, Rfl: 0  •  Syringe, Disposable, 3 ML misc, 1 syringe 1 (One) Time Per Week., Disp: 100 each, Rfl: 11  •  tadalafil (CIALIS) 5 MG tablet, Take 1 tablet by mouth Daily., Disp: 90 tablet, Rfl: 3  •  testosterone cypionate (DEPO-TESTOSTERONE) 100 MG/ML solution injection, Inject 1.5 mL into the appropriate muscle as directed by prescriber 1 (One) Time Per Week., Disp: 10 mL, Rfl: 3  •  triamcinolone (KENALOG) 0.1 % lotion, Apply  topically to the appropriate area as directed 2 (Two) Times a Day As Needed (hand eczema). (Patient taking differently: Apply 1 application topically to the appropriate area as directed 2 (Two) Times a Day As Needed for Irritation (hand eczema).), Disp: 60 mL, Rfl: 0  •  VITAMIN D, CHOLECALCIFEROL, PO, Take 2,000 Units by mouth., Disp: , Rfl:   •  sildenafil (REVATIO) 20 MG tablet, Take 5 tablets by mouth Daily As Needed (1 hr prior to sexual activity). 1 hr prior to sexual activity, Disp: 60 tablet, Rfl: 11    Allergies  Allergies   Allergen Reactions   • Hydrocodone Hives, Itching, Nausea Only and Rash   • Sulfa Antibiotics Itching and Rash       Social History  Social History     Socioeconomic History   • Marital status: " "     Spouse name: Not on file   • Number of children: Not on file   • Years of education: Not on file   • Highest education level: Not on file   Tobacco Use   • Smoking status: Current Every Day Smoker     Packs/day: 0.75     Years: 23.00     Pack years: 17.25     Types: Cigarettes   • Smokeless tobacco: Never Used   Substance and Sexual Activity   • Alcohol use: Yes     Comment: Rare use, reported no history of abuse   • Drug use: No   • Sexual activity: Defer       Family History  He has no family history of prostate cancer    Review of Systems  Constitutional: No fevers or chills; positive decreased energy  Skin: Positive for multiple ingrown hairs and boils of lower extremity  Endocrine: No heat/cold intolerance   Cardiovascular: Negative for chest pain or dyspnea on exertion  Respiratory: Negative for shortness of breath or wheezing  Gastrointestinal: No nausea or vomiting  Genitourinary: Negative for current gross hematuria.  Musculoskeletal: No flank pain; positive intermittent low back pain  Neurological:  Negative for frequent headaches or dizziness  Lymph/Heme: Negative for leg swelling or calf pain.    Physical Exam  Visit Vitals  Temp 98 °F (36.7 °C) (Temporal)   Ht 190.5 cm (75\")   Wt (!) 168 kg (370 lb)   BMI 46.25 kg/m²     Constitutional: NAD, WDWN.   HEENT: NCAT. Conjunctivae normal.  MMM.    Cardiovascular: Regular rate.  Pulmonary/Chest: Respirations are even and non-labored bilaterally.  Abdominal: Soft. No distension, tenderness, masses or guarding. No CVA tenderness.  Neurological: A + O x 3.  Cranial Nerves II-XII grossly intact. Normal gait.  Extremities: TISHA x 4, Warm. No clubbing.  No cyanosis.    Skin: Pink, warm and dry.  Multiple ingrown hairs and excoriations from past follicular infections  Psychiatric:  Normal mood and affect    Genitourinary  Penis: circumcised partially buried penis, glans normal, no penile discharge.  No rashes/lesions.    Testes: descended bilaterally, no " masses, nontender to palpation. Remainder of scrotal contents normal. No hernia appreciated.  Rectal:  Normal tone, no masses.      Labs  Lab Results   Component Value Date    PSA 0.497 08/27/2019    PSA 0.500 05/24/2018       Brief Urine Lab Results  (Last result in the past 365 days)      Color   Clarity   Blood   Leuk Est   Nitrite   Protein   CREAT   Urine HCG        09/30/19 0849 Yellow Clear Negative Small (1+) Negative Negative           Results for BEATRIZ LUU (MRN [id number]) as of 7/30/2020 08:32   Ref. Range 7/24/2020 07:53   Estradiol Latest Units: pg/mL 49.5   Sex Hormone Binding Globulin Latest Ref Range: 16.5 - 55.9 nmol/L 14.0 (L)   Testosterone, Total Latest Ref Range: 264 - 916 ng/dL 465   Testosterone, Free Latest Ref Range: 6.8 - 21.5 pg/mL 13.8   Hemoglobin Latest Ref Range: 13.0 - 17.7 g/dL 17.4   Hematocrit Latest Ref Range: 37.5 - 51.0 % 49.2       He was not able to void for a uroflow, so a repeat postvoid residual was performed, which was done for evaluation with his new urethral stricture diagnosis.      Assessment  Mr. Luu is a 45 y.o. male who presents with LUTS, primarily weak stream, urinary frequency, nocturia, as well as low libido and low energy, low testosterone.  He is status post DVIU with great resolution of his urinary symptoms.  He is currently on testosterone cypionate weekly self injections.  He is scared of needles but is willing to continue.      Plan  1.  Continue weekly testosterone cypionate injections 150mg;   2.  Labs in 6 months  3.  I refilled his daily tadalafil    I spent a total of 25 minutes with the patient in face-to-face interaction, with >50% of the time spent in engaging in counseling on the risks, benefits, and alternatives of the therapy and coordinating care.       Julito Chow MD

## 2020-09-24 ENCOUNTER — OFFICE VISIT (OUTPATIENT)
Dept: INTERNAL MEDICINE | Facility: CLINIC | Age: 45
End: 2020-09-24

## 2020-09-24 VITALS
HEIGHT: 75 IN | TEMPERATURE: 98.4 F | RESPIRATION RATE: 16 BRPM | DIASTOLIC BLOOD PRESSURE: 94 MMHG | BODY MASS INDEX: 39.17 KG/M2 | HEART RATE: 67 BPM | OXYGEN SATURATION: 98 % | SYSTOLIC BLOOD PRESSURE: 155 MMHG | WEIGHT: 315 LBS

## 2020-09-24 DIAGNOSIS — G51.0 LEFT-SIDED BELL'S PALSY: ICD-10-CM

## 2020-09-24 DIAGNOSIS — I10 BENIGN ESSENTIAL HYPERTENSION: Primary | ICD-10-CM

## 2020-09-24 DIAGNOSIS — R73.01 IMPAIRED FASTING GLUCOSE: ICD-10-CM

## 2020-09-24 DIAGNOSIS — M79.2 NEURALGIA: ICD-10-CM

## 2020-09-24 DIAGNOSIS — E78.2 MIXED HYPERLIPIDEMIA: ICD-10-CM

## 2020-09-24 PROCEDURE — 99214 OFFICE O/P EST MOD 30 MIN: CPT | Performed by: INTERNAL MEDICINE

## 2020-09-24 RX ORDER — METHYLPREDNISOLONE 4 MG/1
TABLET ORAL
Qty: 21 TABLET | Refills: 0 | OUTPATIENT
Start: 2020-09-24 | End: 2020-12-07

## 2020-09-24 RX ORDER — METHYLPREDNISOLONE 4 MG/1
TABLET ORAL
Qty: 21 TABLET | Refills: 0 | Status: SHIPPED | OUTPATIENT
Start: 2020-09-24 | End: 2020-09-24 | Stop reason: SDUPTHER

## 2020-09-24 NOTE — PROGRESS NOTES
Subjective   Cooper Luu is a 45 y.o. male.     Chief Complaint   Patient presents with   • Numbness       History of Present Illness   Patient is here to follow-up on his blood pressure which is noted to be elevated, he states he does not take any medications for his blood pressure and his home blood pressure readings are within range, he is advised to monitor his blood pressure at home and follow low-sodium diet, patient is also due for labs for his cholesterol, today he states his been having numbness in the left facial area starting from the left cheekbone to his left lower jaw, he thinks he may be coming down with Bell's palsy, he is already had Bell's Palsy twice in the past, he states his initial Bell's pass he was at the age of 16 , and on examination he is noted to have residual LMN from his previous Bell's palsy which he agrees with, he denies any other neurological symptoms and denies any other cranial nerve involvement and deficits and denies any gross motor or sensory deficits anywhere else, he denies any chest pain or dizziness    The following portions of the patient's history were reviewed and updated as appropriate: allergies, current medications, past family history, past medical history, past social history, past surgical history and problem list.    Review of Systems   Constitutional: Negative for appetite change, fatigue and fever.   HENT: Negative for congestion, ear discharge, ear pain, sinus pressure and sore throat.    Eyes: Negative for pain and discharge.   Respiratory: Negative for cough, shortness of breath and wheezing.    Cardiovascular: Negative for chest pain, palpitations and leg swelling.   Gastrointestinal: Negative for abdominal pain, constipation, diarrhea, nausea and vomiting.   Endocrine: Negative for cold intolerance and heat intolerance.   Genitourinary: Negative for dysuria and flank pain.   Musculoskeletal: Negative for arthralgias and joint swelling.   Skin: Negative  "for pallor and rash.   Allergic/Immunologic: Negative for environmental allergies and food allergies.   Neurological: Positive for numbness. Negative for dizziness and weakness.   Hematological: Negative for adenopathy. Does not bruise/bleed easily.   Psychiatric/Behavioral: Negative for behavioral problems and dysphoric mood. The patient is not nervous/anxious.          Current Outpatient Medications:   •  Alcohol Swabs pads, Clean area prior to injection, Disp: 100 each, Rfl: 11  •  busPIRone (BUSPAR) 10 MG tablet, Take 1 tablet by mouth 3 (Three) Times a Day As Needed (anxiety)., Disp: 90 tablet, Rfl: 5  •  chlorhexidine (HIBICLENS) 4 % external liquid, Apply  topically to the appropriate area as directed Daily As Needed for Wound Care., Disp: 3780 mL, Rfl: 5  •  clindamycin (Cleocin-T) 1 % external solution, Apply  topically to the appropriate area as directed 2 (Two) Times a Day., Disp: 60 mL, Rfl: 5  •  naproxen sodium (ALEVE) 220 MG tablet, Take 220 mg by mouth 2 (Two) Times a Day As Needed for Mild Pain ., Disp: , Rfl:   •  Needle, Disp, 18G X 1-1/2\" misc, Use for drawing up the medication, Disp: 50 each, Rfl: 3  •  Needle, Disp, 23G X 1/2\" misc, Use for injecting the medication, Disp: 100 each, Rfl: 3  •  Syringe, Disposable, 3 ML misc, 1 syringe 1 (One) Time Per Week., Disp: 100 each, Rfl: 11  •  testosterone cypionate (DEPO-TESTOSTERONE) 100 MG/ML solution injection, Inject 1.5 mL into the appropriate muscle as directed by prescriber 1 (One) Time Per Week., Disp: 10 mL, Rfl: 3  •  triamcinolone (KENALOG) 0.1 % lotion, Apply  topically to the appropriate area as directed 2 (Two) Times a Day As Needed (hand eczema). (Patient taking differently: Apply 1 application topically to the appropriate area as directed 2 (Two) Times a Day As Needed for Irritation (hand eczema).), Disp: 60 mL, Rfl: 0  •  VITAMIN D, CHOLECALCIFEROL, PO, Take 2,000 Units by mouth., Disp: , Rfl:   •  methylPREDNISolone (MEDROL) 4 MG dose " "pack, Take as directed on package instructions., Disp: 21 tablet, Rfl: 0  •  oxyCODONE (ROXICODONE) 5 MG immediate release tablet, Take 1 tablet by mouth Every 6 (Six) Hours As Needed for Moderate Pain or Severe Pain ., Disp: 5 tablet, Rfl: 0  •  sildenafil (REVATIO) 20 MG tablet, Take 5 tablets by mouth Daily As Needed (1 hr prior to sexual activity). 1 hr prior to sexual activity, Disp: 60 tablet, Rfl: 11  •  tadalafil (CIALIS) 5 MG tablet, Take 1 tablet by mouth Daily., Disp: 90 tablet, Rfl: 3    Objective     Blood pressure 155/94, pulse 67, temperature 98.4 °F (36.9 °C), resp. rate 16, height 190.5 cm (75\"), weight (!) 161 kg (355 lb), SpO2 98 %.    Physical Exam  Vitals signs and nursing note reviewed.   Constitutional:       General: He is not in acute distress.     Appearance: He is well-developed. He is not diaphoretic.   HENT:      Head: Normocephalic and atraumatic.      Right Ear: External ear normal.      Left Ear: External ear normal.      Nose: Nose normal.   Eyes:      Conjunctiva/sclera: Conjunctivae normal.      Pupils: Pupils are equal, round, and reactive to light.   Neck:      Musculoskeletal: Normal range of motion and neck supple.      Thyroid: No thyromegaly.   Cardiovascular:      Rate and Rhythm: Normal rate and regular rhythm.      Heart sounds: Normal heart sounds.   Pulmonary:      Effort: Pulmonary effort is normal. No respiratory distress.   Abdominal:      General: There is no distension.      Palpations: Abdomen is soft.      Tenderness: There is no abdominal tenderness. There is no guarding.   Musculoskeletal: Normal range of motion.   Lymphadenopathy:      Cervical: No cervical adenopathy.   Skin:     General: Skin is warm and dry.      Findings: No erythema.   Neurological:      Mental Status: He is alert and oriented to person, place, and time.      Comments: Patient has sensory deficits on left side of face below left zygomatic arch  He also has residual Bell's palsy on the left " side  No speech impairment       Patient's Body mass index is 44.37 kg/m². BMI is above normal parameters. Recommendations include: educational material, exercise counseling and nutrition counseling.      Results for orders placed or performed in visit on 07/24/20   TestT+TestF+SHBG    Specimen: Blood   Result Value Ref Range    Testosterone, Total 465 264 - 916 ng/dL    Testosterone, Free 13.8 6.8 - 21.5 pg/mL    Sex Hormone Binding Globulin 14.0 (L) 16.5 - 55.9 nmol/L   Hemoglobin & Hematocrit, Blood    Specimen: Blood   Result Value Ref Range    Hemoglobin 17.4 13.0 - 17.7 g/dL    Hematocrit 49.2 37.5 - 51.0 %   Estradiol    Specimen: Blood   Result Value Ref Range    Estradiol 49.5 pg/mL         Assessment/Plan   Cooper was seen today for numbness.    Diagnoses and all orders for this visit:    Benign essential hypertension    Mixed hyperlipidemia  -     CBC & Differential  -     Comprehensive Metabolic Panel  -     Lipid Panel    Impaired fasting glucose  -     Hemoglobin A1c    Neuralgia  -     MRI Brain Without Contrast  -     US Carotid Bilateral  -     Ambulatory Referral to Neurology    Left-sided Bell's palsy  -     MRI Brain Without Contrast  -     US Carotid Bilateral  -     Ambulatory Referral to Neurology    Other orders  -     Discontinue: methylPREDNISolone (MEDROL) 4 MG dose pack; Take as directed on package instructions.  -     methylPREDNISolone (MEDROL) 4 MG dose pack; Take as directed on package instructions.    Plan:  1.  Benign essential hypertension: Will continue   low-sodium diet advised, Counseled to regularly check BP at home with goal averaging <130/80.  Patient is advised to bring his home blood pressure readings as he may need to be on blood pressure medications  2.mixed hyperlipidemia: Obtain fasting CMP and lipid panel.  Diet and exercise counseled,     3. impaired glucose   : Obtain fasting CMP  and hba1c  , diet and exercise counseled  4.  Neuralgia: We will obtain labs, MRI  brain, carotid Dopplers and refer patient to neurology and start Medrol Dosepak  5.  Residual left-sided Bell's palsy with history of recurrence in the past: We will refer patient to neurology  Patient has been advised to go to the ER if his symptoms worsen  RTC approximately in 3- 4 weeks               Martita Zaragoza MD

## 2020-09-25 RX ORDER — BUSPIRONE HYDROCHLORIDE 10 MG/1
10 TABLET ORAL 3 TIMES DAILY PRN
Qty: 90 TABLET | Refills: 5 | Status: SHIPPED | OUTPATIENT
Start: 2020-09-25 | End: 2021-11-02

## 2020-12-01 ENCOUNTER — TELEPHONE (OUTPATIENT)
Dept: UROLOGY | Facility: CLINIC | Age: 45
End: 2020-12-01

## 2020-12-01 DIAGNOSIS — R30.0 BURNING WITH URINATION: Primary | ICD-10-CM

## 2020-12-01 DIAGNOSIS — R31.0 GROSS HEMATURIA: Primary | ICD-10-CM

## 2020-12-01 DIAGNOSIS — R31.0 GROSS HEMATURIA: ICD-10-CM

## 2020-12-01 NOTE — TELEPHONE ENCOUNTER
Patient came in office to leave urine sample. Scheduled him for cystoscopy and advised him ct was ordered.   LIBAN Hollingsworth

## 2020-12-01 NOTE — TELEPHONE ENCOUNTER
Patient Seen blood in urine a couple times yesterday, having some back/flank pain, and burning with urination. No fever or any other symptoms. Not having the blood today. I advised patient to come give urine sample to check for uti? Please advise.  Edel,LIBAN

## 2020-12-07 ENCOUNTER — APPOINTMENT (OUTPATIENT)
Dept: CT IMAGING | Facility: HOSPITAL | Age: 45
End: 2020-12-07

## 2020-12-18 ENCOUNTER — OFFICE VISIT (OUTPATIENT)
Dept: FAMILY MEDICINE CLINIC | Facility: CLINIC | Age: 45
End: 2020-12-18

## 2020-12-18 VITALS
OXYGEN SATURATION: 99 % | WEIGHT: 315 LBS | HEART RATE: 71 BPM | BODY MASS INDEX: 39.17 KG/M2 | HEIGHT: 75 IN | TEMPERATURE: 97.8 F | DIASTOLIC BLOOD PRESSURE: 80 MMHG | SYSTOLIC BLOOD PRESSURE: 140 MMHG

## 2020-12-18 DIAGNOSIS — G56.02 LEFT CARPAL TUNNEL SYNDROME: ICD-10-CM

## 2020-12-18 DIAGNOSIS — E66.01 MORBID (SEVERE) OBESITY DUE TO EXCESS CALORIES (HCC): Primary | ICD-10-CM

## 2020-12-18 DIAGNOSIS — F41.1 GAD (GENERALIZED ANXIETY DISORDER): ICD-10-CM

## 2020-12-18 DIAGNOSIS — E29.1 HYPOGONADISM IN MALE: ICD-10-CM

## 2020-12-18 DIAGNOSIS — R06.83 SNORING: ICD-10-CM

## 2020-12-18 DIAGNOSIS — M19.90 ARTHRITIS: ICD-10-CM

## 2020-12-18 DIAGNOSIS — Z86.69 HISTORY OF SLEEP APNEA: ICD-10-CM

## 2020-12-18 DIAGNOSIS — I10 ELEVATED BLOOD PRESSURE READING IN OFFICE WITH DIAGNOSIS OF HYPERTENSION: ICD-10-CM

## 2020-12-18 PROCEDURE — 99214 OFFICE O/P EST MOD 30 MIN: CPT | Performed by: NURSE PRACTITIONER

## 2020-12-18 RX ORDER — MAGNESIUM CARB/ALUMINUM HYDROX 105-160MG
TABLET,CHEWABLE ORAL ONCE
COMMUNITY
End: 2020-12-18

## 2020-12-18 RX ORDER — MULTIVIT WITH MINERALS/LUTEIN
1000 TABLET ORAL DAILY
COMMUNITY

## 2020-12-18 NOTE — PROGRESS NOTES
Subjective     Chief Complaint:    Chief Complaint   Patient presents with   • Establish Care   • Carpal Tunnel     left thumb       History of Present Illness:   Here to establish care. Has hx of acid reflux that is intermittent.   Has hx of OA. Had disectomy in her cervical spine in 2010 due to rupture disc. Hx of back sx. 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 Has left over arthrtitis from these.     Has hx of carpal tunnel. Had EMG in 2010 with Dr. Petersen and it was positive. Notes worsening. Has trouble holding things. IS dropping things. No numbness. Just weak. Pain is around the base of this left thumb. Will sometimes wake up with numbness. He has done bracing in the past. No injection.     Has hx of anxiety and a little PTSD. Takes buspar as needed. Takes about weekly when he gets anxious.     Recently had UTI. Was treated and felt better.     He has been doing intermittent fasting and notes he lost 17 lbs.     Has problems with feeling tired and run down during the day. Has low T and follows with Dr. Chow. Notes snoring. Has hx of YAEL.     Review of Systems  Gen- No fevers, chills  CV- No chest pain, palpitations  Resp- No cough, dyspnea  GI- No N/V/D, abd pain  Neuro-No dizziness, headaches      I have reviewed and/or updated the patient's past medical, surgical, family, social history and problem list as appropriate.     Medications:    Current Outpatient Medications:   •  busPIRone (BUSPAR) 10 MG tablet, Take 1 tablet by mouth 3 (Three) Times a Day As Needed (anxiety)., Disp: 90 tablet, Rfl: 5  •  levocetirizine (XYZAL) 5 MG tablet, Take 5 mg by mouth Every Evening., Disp: , Rfl:   •  tadalafil (CIALIS) 5 MG tablet, Take 1 tablet by mouth Daily., Disp: 90 tablet, Rfl: 3  •  testosterone cypionate (DEPO-TESTOSTERONE) 100 MG/ML solution injection, Inject 1.5 mL into the appropriate muscle as directed by prescriber 1 (One) Time Per Week., Disp: 10 mL, Rfl: 3  •  vitamin C (ASCORBIC ACID)  "250 MG tablet, Take 250 mg by mouth Daily., Disp: , Rfl:   •  VITAMIN D, CHOLECALCIFEROL, PO, Take 2,000 Units by mouth., Disp: , Rfl:   •  Zinc 50 MG capsule, Take  by mouth., Disp: , Rfl:     Allergies:  Allergies   Allergen Reactions   • Hydrocodone Hives, Itching, Nausea Only and Rash   • Sulfa Antibiotics Itching and Rash       Objective     Vital Signs:   Vitals:    12/18/20 0847   BP: 140/80   Pulse: 71   Temp: 97.8 °F (36.6 °C)   SpO2: 99%   Weight: (!) 158 kg (348 lb)   Height: 190.5 cm (75\")   PainSc:   5   PainLoc: Back       Physical Exam:    Physical Exam  Vitals signs and nursing note reviewed.   Constitutional:       Appearance: He is well-developed.   HENT:      Head: Normocephalic and atraumatic.      Right Ear: Tympanic membrane, ear canal and external ear normal.      Left Ear: Tympanic membrane, ear canal and external ear normal.      Nose: Nose normal.      Mouth/Throat:      Pharynx: Uvula midline.   Eyes:      General: Lids are normal. No scleral icterus.     Conjunctiva/sclera: Conjunctivae normal.      Pupils: Pupils are equal, round, and reactive to light.   Neck:      Musculoskeletal: Neck supple.      Thyroid: No thyromegaly.      Vascular: No carotid bruit.      Trachea: No tracheal deviation.   Cardiovascular:      Rate and Rhythm: Normal rate and regular rhythm.      Heart sounds: Normal heart sounds. No murmur. No friction rub. No gallop.    Pulmonary:      Effort: Pulmonary effort is normal.      Breath sounds: Normal breath sounds.   Abdominal:      General: Bowel sounds are normal. There is no distension.      Palpations: Abdomen is soft.      Tenderness: There is no abdominal tenderness.   Lymphadenopathy:      Head:      Right side of head: No submental, submandibular, tonsillar, preauricular or posterior auricular adenopathy.      Left side of head: No submental, submandibular, tonsillar, preauricular or posterior auricular adenopathy.      Cervical: No cervical adenopathy. "   Skin:     General: Skin is warm and dry.      Findings: No rash.   Neurological:      Mental Status: He is alert and oriented to person, place, and time.      Cranial Nerves: No cranial nerve deficit.      Sensory: No sensory deficit.   Psychiatric:         Behavior: Behavior normal.         Assessment / Plan     Assessment/Plan:   Problem List Items Addressed This Visit     None      Visit Diagnoses     Morbid (severe) obesity due to excess calories (CMS/HCC)    -  Primary    Relevant Orders    Comprehensive Metabolic Panel (Completed)    CBC Auto Differential    Hemoglobin A1c (Completed)    Lipid Panel (Completed)    Elevated blood pressure reading in office with diagnosis of hypertension        KEITH (generalized anxiety disorder)        Arthritis        History of sleep apnea        Relevant Orders    Home Sleep Study    Left carpal tunnel syndrome        Relevant Orders    Ambulatory Referral to Orthopedic Surgery    Snoring        Relevant Orders    Home Sleep Study    Hypogonadism in male        Relevant Orders    PSA DIAGNOSTIC (Completed)        -- orders as above  -- has failed conservative measures for carpal tunnel, will refer to ortho  -- continue buspar prn keith  -- sleep study as I feel he probably has YAEL   -- continue to monitor BP. No meds at this time  -- discussed diet and exercise. Encouraged weight loss    Follow up:  As needed    Electronically signed by CHRISTINE Walton   12/18/2020 09:26 EST      Please note that portions of this note may have been completed with a voice recognition program. Efforts were made to edit the dictations, but occasionally words are mistranscribed.

## 2020-12-19 LAB
ALBUMIN SERPL-MCNC: 4.5 G/DL (ref 3.5–5.2)
ALBUMIN/GLOB SERPL: 1.4 G/DL
ALP SERPL-CCNC: 68 U/L (ref 39–117)
ALT SERPL-CCNC: 38 U/L (ref 1–41)
AST SERPL-CCNC: 24 U/L (ref 1–40)
BASOPHILS # BLD AUTO: 0.08 10*3/MM3 (ref 0–0.2)
BASOPHILS NFR BLD AUTO: 0.8 % (ref 0–1.5)
BILIRUB SERPL-MCNC: 0.8 MG/DL (ref 0–1.2)
BUN SERPL-MCNC: 9 MG/DL (ref 6–20)
BUN/CREAT SERPL: 7.8 (ref 7–25)
CALCIUM SERPL-MCNC: 9.6 MG/DL (ref 8.6–10.5)
CHLORIDE SERPL-SCNC: 100 MMOL/L (ref 98–107)
CHOLEST SERPL-MCNC: 199 MG/DL (ref 0–200)
CO2 SERPL-SCNC: 27.6 MMOL/L (ref 22–29)
CREAT SERPL-MCNC: 1.16 MG/DL (ref 0.76–1.27)
EOSINOPHIL # BLD AUTO: 0.43 10*3/MM3 (ref 0–0.4)
EOSINOPHIL NFR BLD AUTO: 4.1 % (ref 0.3–6.2)
ERYTHROCYTE [DISTWIDTH] IN BLOOD BY AUTOMATED COUNT: 13.6 % (ref 12.3–15.4)
GLOBULIN SER CALC-MCNC: 3.3 GM/DL
GLUCOSE SERPL-MCNC: 100 MG/DL (ref 65–99)
HBA1C MFR BLD: 5.5 % (ref 4.8–5.6)
HCT VFR BLD AUTO: 52 % (ref 37.5–51)
HDLC SERPL-MCNC: 27 MG/DL (ref 40–60)
HGB BLD-MCNC: 18.1 G/DL (ref 13–17.7)
IMM GRANULOCYTES # BLD AUTO: 0.04 10*3/MM3 (ref 0–0.05)
IMM GRANULOCYTES NFR BLD AUTO: 0.4 % (ref 0–0.5)
LDLC SERPL CALC-MCNC: 141 MG/DL (ref 0–100)
LYMPHOCYTES # BLD AUTO: 2.6 10*3/MM3 (ref 0.7–3.1)
LYMPHOCYTES NFR BLD AUTO: 24.6 % (ref 19.6–45.3)
MCH RBC QN AUTO: 31.9 PG (ref 26.6–33)
MCHC RBC AUTO-ENTMCNC: 34.8 G/DL (ref 31.5–35.7)
MCV RBC AUTO: 91.5 FL (ref 79–97)
MONOCYTES # BLD AUTO: 0.71 10*3/MM3 (ref 0.1–0.9)
MONOCYTES NFR BLD AUTO: 6.7 % (ref 5–12)
NEUTROPHILS # BLD AUTO: 6.73 10*3/MM3 (ref 1.7–7)
NEUTROPHILS NFR BLD AUTO: 63.4 % (ref 42.7–76)
NRBC BLD AUTO-RTO: 0.1 /100 WBC (ref 0–0.2)
PLATELET # BLD AUTO: 305 10*3/MM3 (ref 140–450)
POTASSIUM SERPL-SCNC: 4.7 MMOL/L (ref 3.5–5.2)
PROT SERPL-MCNC: 7.8 G/DL (ref 6–8.5)
PSA SERPL-MCNC: 1.24 NG/ML (ref 0–4)
RBC # BLD AUTO: 5.68 10*6/MM3 (ref 4.14–5.8)
SODIUM SERPL-SCNC: 137 MMOL/L (ref 136–145)
TRIGL SERPL-MCNC: 168 MG/DL (ref 0–150)
VLDLC SERPL CALC-MCNC: 31 MG/DL (ref 5–40)
WBC # BLD AUTO: 10.59 10*3/MM3 (ref 3.4–10.8)

## 2020-12-22 NOTE — PROGRESS NOTES
Labs show mild elevation in cholesterol. Work on decreasing sugary and starchy foods. His hgb is elevated which is probably due to testosterone therapy and will let Dr. Chow adjust his testosterone if he feels its needed

## 2021-01-05 ENCOUNTER — OFFICE VISIT (OUTPATIENT)
Dept: ORTHOPEDIC SURGERY | Facility: CLINIC | Age: 46
End: 2021-01-05

## 2021-01-05 VITALS — WEIGHT: 315 LBS | BODY MASS INDEX: 39.17 KG/M2 | HEIGHT: 75 IN | TEMPERATURE: 96.6 F

## 2021-01-05 DIAGNOSIS — M25.532 ARTHRALGIA OF LEFT WRIST: ICD-10-CM

## 2021-01-05 DIAGNOSIS — G56.02 CARPAL TUNNEL SYNDROME OF LEFT WRIST: Primary | ICD-10-CM

## 2021-01-05 DIAGNOSIS — G56.03 BILATERAL CARPAL TUNNEL SYNDROME: ICD-10-CM

## 2021-01-05 PROCEDURE — 99204 OFFICE O/P NEW MOD 45 MIN: CPT | Performed by: ORTHOPAEDIC SURGERY

## 2021-01-05 NOTE — PROGRESS NOTES
Subjective   Patient ID: Cooper Luu is a 45 y.o. male  Pain of the Left Hand (Patient is here today for bilateral hand carpal tunnel syndrome, he states he had an EMG done 10 years ago at  with Dr. Petersen. 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 Petersen in [place], has been experiencing 8 to 10 years of paresthesias in both hands left more so than the right minimally improved with splinting and medications, he is noticing that the left hand is weak when he tries to rotate the left hand into pronation while lifting.  Has not noticed any change of motion either wrist, does complain of loss of feeling in the fingertips of the index long and slightly on the ring finger both hands.  Has had chronic neck pain no recent change in range of motion cervical spine no lower extremity paresthesias.  Was seen recently by Dr. Petersen and was told he is developing lumbar spinal stenosis.      Review of Systems   Constitutional: Negative for fever.   HENT: Negative for dental problem and voice change.    Eyes: Negative for visual disturbance.   Respiratory: Negative for shortness of breath.    Cardiovascular: Negative for chest pain.   Gastrointestinal: Negative for abdominal pain.   Genitourinary: Negative for dysuria.   Musculoskeletal: Positive for arthralgias. Negative for gait problem and joint swelling.   Skin: Negative for rash.   Neurological: Negative for speech difficulty.   Hematological: Does not bruise/bleed easily.   Psychiatric/Behavioral: Negative for confusion.       Past Medical History:   Diagnosis Date   • Anxiety    • Arthritis     Bothersome in knees and  back    • Bell's palsy     Reported in 1986 and then again in 2001.  Reported he has residual weakness left side of face.    • Cystic acne    • Eczema    • GERD (gastroesophageal reflux disease)     Intermittent episodes   • History of fracture     Left tibia, left ankle (required surgical intervention)   • History of UTI 1997   • Infection, skin, staph     Patient reported history of several epsidoes but reported was not diagnosed as having MRSA   • Injury of back    • Low testosterone    • Sinus problem     Reported noted in the spring time   • Sleep apnea     Patient reported diagnosed around 2004 and was not Rx a CPAP.  Reported he has not had any other sleep studies since that time.    • Tattoos    • Wears glasses    • Wears partial dentures     Full upper plate - instructed no adhesives the DOS        Past Surgical History:   Procedure Laterality Date   • BACK SURGERY      x1 lower back   • CERVICAL DISCECTOMY ANTERIOR      x1   • CHOLECYSTECTOMY     • CYSTOSCOPY URETHROTOMY VISUAL INTERNAL N/A 10/7/2019    Procedure: CYSTOSCOPY DIRECT VISION URETHROTOMY  INTERNAL;  Surgeon: Julito Chow MD;  Location: [facility];  Service: Urology   • FRACTURE SURGERY Left     Tibia and ankle - required surgical intervention with placement of hardware that was later removed   • TONSILLECTOMY         Family History   Problem Relation Age of Onset   • Cancer Mother    • Diabetes Mother    • Diabetes Father    • Heart attack Father        Social History     Socioeconomic History   • Marital status:      Spouse name: Not on file   • Number of children: Not on file   • Years of education: Not on file   • Highest education level: Not on file   Tobacco Use   • Smoking status: Current Every Day Smoker     Packs/day: 0.75     Years: 23.00     Pack years: 17.25     Types: Cigarettes   • Smokeless tobacco: Never Used   Substance and Sexual Activity   • Alcohol use: Yes     Comment: Rare use, reported no history of abuse  "  • Drug use: No   • Sexual activity: Yes     Partners: Female     Comment:        I have reviewed the medical and surgical history, family history, social history, medications, and/or allergies, and the review of systems of this report.    Allergies   Allergen Reactions   • Hydrocodone Hives, Itching, Nausea Only and Rash   • Sulfa Antibiotics Itching and Rash         Current Outpatient Medications:   •  busPIRone (BUSPAR) 10 MG tablet, Take 1 tablet by mouth 3 (Three) Times a Day As Needed (anxiety)., Disp: 90 tablet, Rfl: 5  •  tadalafil (CIALIS) 5 MG tablet, Take 1 tablet by mouth Daily., Disp: 90 tablet, Rfl: 3  •  testosterone cypionate (DEPO-TESTOSTERONE) 100 MG/ML solution injection, Inject 1.5 mL into the appropriate muscle as directed by prescriber 1 (One) Time Per Week., Disp: 10 mL, Rfl: 3  •  vitamin C (ASCORBIC ACID) 250 MG tablet, Take 250 mg by mouth Daily., Disp: , Rfl:   •  VITAMIN D, CHOLECALCIFEROL, PO, Take 2,000 Units by mouth., Disp: , Rfl:   •  Zinc 50 MG capsule, Take  by mouth., Disp: , Rfl:     Objective   Temp 96.6 °F (35.9 °C)   Ht 190.5 cm (75\")   Wt (!) 156 kg (344 lb)   BMI 43.00 kg/m²    Physical Exam  Constitutional: Patient is oriented to person, place, and time. Patient appears well-developed and well-nourished.   HENT:Head: Normocephalic and atraumatic.   Eyes: EOM are normal. Pupils are equal, round, and reactive to light.   Neck: Normal range of motion. Neck supple.   Cardiovascular: Normal rate.    Pulmonary/Chest: Effort normal and breath sounds normal.   Abdominal: Soft.   Neurological: Patient is alert and oriented to person, place, and time.   Skin: Skin is warm and dry.   Psychiatric: Patient has a normal mood and affect.   Nursing note and vitals reviewed.       [unfilled]   Left hand: No thenar atrophy there is crepitus with range of motion at the wrist but no pain no signs of carpal instability, supination pronation is full there is crepitus noted with " radial deviation dorsiflexion over the radial sided proximal carpal area.  Tinel's sign is positive for long finger paresthesias, carpal compression test is positive for index finger paresthesias, Phalen sign is negative no triggering of the fingers sensation decreased in the median nerve distribution.  Mo sign negative.    Right hand: No thenar atrophy, full painless wrist range of motion without crepitus, no triggering of the fingers full active range of motion all fingers, Tinel's Phalen sign both positive for paresthesias carpal compression test negative for paresthesias, Mo sign negative    Assessment/Plan   Review of Radiographic Studies:    Left wrist x-ray does confirm grade 3 arthrosis trapeziometacarpal region with minimal arthrosis noted at the radial ulnar joint but no sign of scapholunate diastases or significant radiocarpal osteoarthritis      Procedures     Diagnoses and all orders for this visit:    1. Carpal tunnel syndrome of left wrist (Primary)  -     XR Wrist 3+ View Left; Future  -     EMG & Nerve Conduction Test    2. Arthralgia of left wrist  -     XR Wrist 3+ View Left; Future    3. Bilateral carpal tunnel syndrome  -     EMG & Nerve Conduction Test       Orthopedic activities reviewed and patient expressed appreciation and Use brace as instructed      Recommendations/Plan:   Work/Activity Status: May perform usual activities as tolerated    Patient agreeable to call or return sooner for any concerns.             Impression:  History of ACDF with Dr. Petersen 7-8 years ago, more than 10 years of symptoms of carpal tunnel syndrome both hands left more severe than right  Plan:  EMG study both hands continue night splinting, discuss surgical treatment after EMG complete    Addendum dated 2/12/2021:    EMG study did confirm severe carpal tunnel syndrome the left hand moderate carpal tunnel syndrome right hand.  Given continued symptoms of left hand carpal tunnel syndrome with no  improvement with splinting patient wishes to proceed with surgical treatment.  I explained to him the nature of the operation risk complications limitations possibility for no improvement or worsening or chronic pain even despite the surgery was explained to him he wishes to proceed with surgical treatment.    I discussed with the patient the diagnosis, condition, natural history and treatment alternatives, both surgical and nonsurgical.  I reviewed the surgical procedural details using models, diagrams and reviewing x-ray findings.  I explained the nature of the operation, anesthesia methods and the postoperative recovery.  I explained risks and complications including but not limited to infection, bleeding, blood clotting, poor healing, chronic pain, stiffness, failure of the procedure, possible recurrence of the condition and anesthetic related risks.  The patient had opportunity to ask questions which were all answered to their satisfaction and decided to proceed with the plan for surgery.  I believe informed consent to proceed with the surgery was given verbally in my presence today.  The surgical consent form will be signed in the presence of the nursing staff prior to the surgery.

## 2021-01-13 ENCOUNTER — PROCEDURE VISIT (OUTPATIENT)
Dept: ORTHOPEDIC SURGERY | Facility: CLINIC | Age: 46
End: 2021-01-13

## 2021-01-13 DIAGNOSIS — G56.02 CARPAL TUNNEL SYNDROME OF LEFT WRIST: ICD-10-CM

## 2021-01-13 DIAGNOSIS — R20.2 PARESTHESIA: ICD-10-CM

## 2021-01-13 DIAGNOSIS — G56.03 BILATERAL CARPAL TUNNEL SYNDROME: ICD-10-CM

## 2021-01-13 PROCEDURE — 95886 MUSC TEST DONE W/N TEST COMP: CPT | Performed by: PHYSICAL THERAPIST

## 2021-01-13 PROCEDURE — 95911 NRV CNDJ TEST 9-10 STUDIES: CPT | Performed by: PHYSICAL THERAPIST

## 2021-01-19 ENCOUNTER — OFFICE VISIT (OUTPATIENT)
Dept: ORTHOPEDIC SURGERY | Facility: CLINIC | Age: 46
End: 2021-01-19

## 2021-01-19 DIAGNOSIS — G56.02 CARPAL TUNNEL SYNDROME OF LEFT WRIST: Primary | ICD-10-CM

## 2021-01-19 NOTE — PROGRESS NOTES
You have chosen to receive care through a telephone visit. Do you consent to use a telephone visit for your medical care today? Yes    Telephone conversation with Cooper regarding his recent EMG study which did confirm severe carpal tunnel syndrome on the left hand and moderate carpal tunnel syndrome on the right hand.  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  I reviewed with him the natural history of the condition treatment options pros and cons of surgical care the nature of the procedure postop recovery.  Given his severity of carpal tunnel syndrome I did tell him he may not recover any strength in his hand even after the surgery but the purpose of the surgery is to prevent further weakening in the hand.  He understood this would like to proceed and schedule his left hand carpal tunnel for February 12.  Preop he will need a CBC BMP he understands he will be limited in use of his left hand for at least 2 to 4 weeks after surgery.  Time spent in conversation with him was 12 minutes

## 2021-01-26 ENCOUNTER — TELEPHONE (OUTPATIENT)
Dept: ORTHOPEDIC SURGERY | Facility: CLINIC | Age: 46
End: 2021-01-26

## 2021-01-26 ENCOUNTER — PREP FOR SURGERY (OUTPATIENT)
Dept: OTHER | Facility: HOSPITAL | Age: 46
End: 2021-01-26

## 2021-01-26 DIAGNOSIS — Z01.818 PREOP EXAMINATION: Primary | ICD-10-CM

## 2021-01-26 NOTE — TELEPHONE ENCOUNTER
Caller: MARKLEY    Relationship: WIFE    Best call back number: DAY- WORK NUMBER-LEAVE [phone]  AFTER 4PM CALL- [phone]    What orders are you requesting (i.e. lab or imaging): CARPAL TUNNEL SURGERY    In what timeframe would the patient need ASAP        Additional notes: PT WIFE STATES REILLY TOLD THEM HE SCHED SURGERY 2/12 AND ALREADY TOOK OFF WORK FOR IT. PT WIFE WANTS A CALL BACK RFEGARDING THIS MATTER.  PT NEVER RECEIVED CALL ABOUT SURGERY.

## 2021-01-28 ENCOUNTER — LAB (OUTPATIENT)
Dept: LAB | Facility: HOSPITAL | Age: 46
End: 2021-01-28

## 2021-01-28 PROCEDURE — 85018 HEMOGLOBIN: CPT | Performed by: UROLOGY

## 2021-01-28 PROCEDURE — 84403 ASSAY OF TOTAL TESTOSTERONE: CPT | Performed by: UROLOGY

## 2021-01-28 PROCEDURE — 85014 HEMATOCRIT: CPT | Performed by: UROLOGY

## 2021-01-28 PROCEDURE — 84270 ASSAY OF SEX HORMONE GLOBUL: CPT | Performed by: UROLOGY

## 2021-01-28 PROCEDURE — 84402 ASSAY OF FREE TESTOSTERONE: CPT | Performed by: UROLOGY

## 2021-01-28 PROCEDURE — 82670 ASSAY OF TOTAL ESTRADIOL: CPT | Performed by: UROLOGY

## 2021-01-28 RX ORDER — CEFAZOLIN SODIUM 2 G/50ML
2 SOLUTION INTRAVENOUS
Status: CANCELLED | OUTPATIENT
Start: 2021-01-29 | End: 2021-01-30

## 2021-01-29 PROBLEM — Z01.818 PREOP EXAMINATION: Status: ACTIVE | Noted: 2021-01-29

## 2021-02-10 ENCOUNTER — APPOINTMENT (OUTPATIENT)
Dept: PREADMISSION TESTING | Facility: HOSPITAL | Age: 46
End: 2021-02-10

## 2021-02-10 ENCOUNTER — TELEPHONE (OUTPATIENT)
Dept: ORTHOPEDIC SURGERY | Facility: CLINIC | Age: 46
End: 2021-02-10

## 2021-02-10 VITALS — HEIGHT: 75 IN | WEIGHT: 315 LBS | BODY MASS INDEX: 39.17 KG/M2

## 2021-02-10 DIAGNOSIS — Z01.818 PREOP EXAMINATION: ICD-10-CM

## 2021-02-10 LAB
ANION GAP SERPL CALCULATED.3IONS-SCNC: 12.3 MMOL/L (ref 5–15)
BASOPHILS # BLD AUTO: 0.07 10*3/MM3 (ref 0–0.2)
BASOPHILS NFR BLD AUTO: 0.7 % (ref 0–1.5)
BUN SERPL-MCNC: 7 MG/DL (ref 6–20)
BUN/CREAT SERPL: 7.5 (ref 7–25)
CALCIUM SPEC-SCNC: 9.2 MG/DL (ref 8.6–10.5)
CHLORIDE SERPL-SCNC: 103 MMOL/L (ref 98–107)
CO2 SERPL-SCNC: 22.7 MMOL/L (ref 22–29)
CREAT SERPL-MCNC: 0.93 MG/DL (ref 0.76–1.27)
DEPRECATED RDW RBC AUTO: 42.3 FL (ref 37–54)
EOSINOPHIL # BLD AUTO: 0.42 10*3/MM3 (ref 0–0.4)
EOSINOPHIL NFR BLD AUTO: 4.2 % (ref 0.3–6.2)
ERYTHROCYTE [DISTWIDTH] IN BLOOD BY AUTOMATED COUNT: 12.7 % (ref 12.3–15.4)
GFR SERPL CREATININE-BSD FRML MDRD: 88 ML/MIN/1.73
GLUCOSE SERPL-MCNC: 86 MG/DL (ref 65–99)
HCT VFR BLD AUTO: 50.6 % (ref 37.5–51)
HGB BLD-MCNC: 17.7 G/DL (ref 13–17.7)
IMM GRANULOCYTES # BLD AUTO: 0.02 10*3/MM3 (ref 0–0.05)
IMM GRANULOCYTES NFR BLD AUTO: 0.2 % (ref 0–0.5)
LYMPHOCYTES # BLD AUTO: 2.85 10*3/MM3 (ref 0.7–3.1)
LYMPHOCYTES NFR BLD AUTO: 28.4 % (ref 19.6–45.3)
MCH RBC QN AUTO: 31.6 PG (ref 26.6–33)
MCHC RBC AUTO-ENTMCNC: 35 G/DL (ref 31.5–35.7)
MCV RBC AUTO: 90.2 FL (ref 79–97)
MONOCYTES # BLD AUTO: 0.59 10*3/MM3 (ref 0.1–0.9)
MONOCYTES NFR BLD AUTO: 5.9 % (ref 5–12)
NEUTROPHILS NFR BLD AUTO: 6.09 10*3/MM3 (ref 1.7–7)
NEUTROPHILS NFR BLD AUTO: 60.6 % (ref 42.7–76)
NRBC BLD AUTO-RTO: 0 /100 WBC (ref 0–0.2)
PLATELET # BLD AUTO: 271 10*3/MM3 (ref 140–450)
PMV BLD AUTO: 9.9 FL (ref 6–12)
POTASSIUM SERPL-SCNC: 3.8 MMOL/L (ref 3.5–5.2)
RBC # BLD AUTO: 5.61 10*6/MM3 (ref 4.14–5.8)
SODIUM SERPL-SCNC: 138 MMOL/L (ref 136–145)
WBC # BLD AUTO: 10.04 10*3/MM3 (ref 3.4–10.8)

## 2021-02-10 PROCEDURE — 85025 COMPLETE CBC W/AUTO DIFF WBC: CPT

## 2021-02-10 PROCEDURE — 36415 COLL VENOUS BLD VENIPUNCTURE: CPT

## 2021-02-10 PROCEDURE — U0004 COV-19 TEST NON-CDC HGH THRU: HCPCS

## 2021-02-10 PROCEDURE — 93005 ELECTROCARDIOGRAM TRACING: CPT

## 2021-02-10 PROCEDURE — 93010 ELECTROCARDIOGRAM REPORT: CPT | Performed by: INTERNAL MEDICINE

## 2021-02-10 PROCEDURE — C9803 HOPD COVID-19 SPEC COLLECT: HCPCS

## 2021-02-10 PROCEDURE — 80048 BASIC METABOLIC PNL TOTAL CA: CPT

## 2021-02-10 RX ORDER — SACCHAROMYCES BOULARDII 250 MG
250 CAPSULE ORAL DAILY
COMMUNITY
End: 2022-06-16

## 2021-02-10 NOTE — TELEPHONE ENCOUNTER
Caller:HUMZA SOUSA  Relationship to Patient: SELF    Phone Number: [phone]  Reason for Call: PATIENT WOULD TO KNOW IF AFTER HIS PROCEDURE ON 02/11/21 IF HE CAN GO BACK TO WORK ON Tuesday OR Wednesday BECAUSE IF HE IS NOT ALLOWED TO GO BACK TO WORK HE WILL NEED TO GET HIS SHORT TERM DISABILITY PAPERWORK STARTED.

## 2021-02-10 NOTE — TELEPHONE ENCOUNTER
I spoke with patient to inform he would be off until his follow up on 2/25/21, he will get the paperwork for STD and send to me

## 2021-02-10 NOTE — DISCHARGE INSTRUCTIONS
PAT PASS GIVEN/REVIEWED WITH PT.  VERBALIZED UNDERSTANDING OF THE FOLLOWING:  DO NOT EAT, DRINK, SMOKE, USE SMOKELESS TOBACCO OR CHEW GUM AFTER MIDNIGHT THE NIGHT BEFORE SURGERY.  THIS ALSO INCLUDES HARD CANDIES AND MINTS.    DO NOT SHAVE THE AREA TO BE OPERATED ON AT LEAST 48 HOURS PRIOR TO THE PROCEDURE.  DO NOT WEAR MAKE UP OR NAIL POLISH.  DO NOT LEAVE IN ANY PIERCING OR WEAR JEWELRY THE DAY OF SURGERY.      DO NOT USE ADHESIVES IF YOU WEAR DENTURES.    DO NOT WEAR EYE CONTACTS; BRING IN YOUR GLASSES.    ONLY TAKE MEDICATION THE MORNING OF YOUR PROCEDURE IF INSTRUCTED BY YOUR SURGEON WITH ENOUGH WATER TO SWALLOW THE MEDICATION.  IF YOUR SURGEON DID NOT SPECIFY WHICH MEDICATIONS TO TAKE, YOU WILL NEED TO CALL THEIR OFFICE FOR FURTHER INSTRUCTIONS AND DO AS THEY INSTRUCT.    LEAVE ANYTHING YOU CONSIDER VALUABLE AT HOME.    YOU WILL NEED TO ARRANGE FOR SOMEONE TO DRIVE YOU HOME AFTER SURGERY.  IT IS RECOMMENDED THAT YOU DO NOT DRIVE, WORK, DRINK ALCOHOL OR MAKE MAJOR DECISIONS FOR AT LEAST 24 HOURS AFTER YOUR PROCEDURE IS COMPLETE.      THE DAY OF YOUR PROCEDURE, BRING IN THE FOLLOWING IF APPLICABLE:   PICTURE ID AND INSURANCE/MEDICARE OR MEDICAID CARDS/ANY CO-PAY THAT MAY BE DUE   COPY OF ADVANCED DIRECTIVE/LIVING WILL/POWER OR    CPAP/BIPAP/INHALERS   SKIN PREP SHEET   YOUR PREADMISSION TESTING PASS (IF NOT A PHONE HISTORY)            COVID self-quarantine instructions reviewed with the pt.  Verbalized understanding.Chlorhexidine wipes given to patient with verification sheet. Patient/Family member verbalized understanding to all teaching and instruction.

## 2021-02-11 LAB — SARS-COV-2 RNA RESP QL NAA+PROBE: NOT DETECTED

## 2021-02-12 ENCOUNTER — ANESTHESIA EVENT (OUTPATIENT)
Dept: PERIOP | Facility: HOSPITAL | Age: 46
End: 2021-02-12

## 2021-02-12 ENCOUNTER — HOSPITAL ENCOUNTER (OUTPATIENT)
Facility: HOSPITAL | Age: 46
Setting detail: HOSPITAL OUTPATIENT SURGERY
Discharge: HOME | End: 2021-02-12
Attending: ORTHOPAEDIC SURGERY | Admitting: ORTHOPAEDIC SURGERY

## 2021-02-12 ENCOUNTER — ANESTHESIA (OUTPATIENT)
Dept: PERIOP | Facility: HOSPITAL | Age: 46
End: 2021-02-12

## 2021-02-12 VITALS
SYSTOLIC BLOOD PRESSURE: 125 MMHG | RESPIRATION RATE: 16 BRPM | OXYGEN SATURATION: 98 % | DIASTOLIC BLOOD PRESSURE: 72 MMHG | TEMPERATURE: 97 F | HEART RATE: 77 BPM

## 2021-02-12 DIAGNOSIS — Z01.818 PREOP EXAMINATION: ICD-10-CM

## 2021-02-12 PROCEDURE — S0260 H&P FOR SURGERY: HCPCS | Performed by: ORTHOPAEDIC SURGERY

## 2021-02-12 PROCEDURE — 25010000002 PROPOFOL 10 MG/ML EMULSION: Performed by: NURSE ANESTHETIST, CERTIFIED REGISTERED

## 2021-02-12 PROCEDURE — 90686 IIV4 VACC NO PRSV 0.5 ML IM: CPT | Performed by: ORTHOPAEDIC SURGERY

## 2021-02-12 PROCEDURE — 64721 CARPAL TUNNEL SURGERY: CPT | Performed by: ORTHOPAEDIC SURGERY

## 2021-02-12 PROCEDURE — G0008 ADMIN INFLUENZA VIRUS VAC: HCPCS | Performed by: ORTHOPAEDIC SURGERY

## 2021-02-12 PROCEDURE — 25010000002 FENTANYL CITRATE (PF) 100 MCG/2ML SOLUTION: Performed by: NURSE ANESTHETIST, CERTIFIED REGISTERED

## 2021-02-12 PROCEDURE — 25010000002 ONDANSETRON PER 1 MG: Performed by: NURSE ANESTHETIST, CERTIFIED REGISTERED

## 2021-02-12 PROCEDURE — 25010000002 KETOROLAC TROMETHAMINE PER 15 MG: Performed by: NURSE ANESTHETIST, CERTIFIED REGISTERED

## 2021-02-12 PROCEDURE — 25010000002 INFLUENZA VAC SPLIT QUAD 0.5 ML SUSPENSION PREFILLED SYRINGE: Performed by: ORTHOPAEDIC SURGERY

## 2021-02-12 PROCEDURE — 25010000003 CEFAZOLIN SODIUM-DEXTROSE 2-3 GM-%(50ML) RECONSTITUTED SOLUTION: Performed by: ORTHOPAEDIC SURGERY

## 2021-02-12 PROCEDURE — 25010000003 LIDOCAINE 1 % SOLUTION: Performed by: ORTHOPAEDIC SURGERY

## 2021-02-12 RX ORDER — CEFAZOLIN SODIUM 2 G/50ML
2 SOLUTION INTRAVENOUS
Status: COMPLETED | OUTPATIENT
Start: 2021-02-12 | End: 2021-02-12

## 2021-02-12 RX ORDER — LIDOCAINE HYDROCHLORIDE 10 MG/ML
INJECTION, SOLUTION INFILTRATION; PERINEURAL AS NEEDED
Status: DISCONTINUED | OUTPATIENT
Start: 2021-02-12 | End: 2021-02-12 | Stop reason: HOSPADM

## 2021-02-12 RX ORDER — FENTANYL CITRATE 50 UG/ML
INJECTION, SOLUTION INTRAMUSCULAR; INTRAVENOUS AS NEEDED
Status: DISCONTINUED | OUTPATIENT
Start: 2021-02-12 | End: 2021-02-12 | Stop reason: SURG

## 2021-02-12 RX ORDER — KETOROLAC TROMETHAMINE 30 MG/ML
INJECTION, SOLUTION INTRAMUSCULAR; INTRAVENOUS AS NEEDED
Status: DISCONTINUED | OUTPATIENT
Start: 2021-02-12 | End: 2021-02-12 | Stop reason: SURG

## 2021-02-12 RX ORDER — PROPOFOL 10 MG/ML
VIAL (ML) INTRAVENOUS AS NEEDED
Status: DISCONTINUED | OUTPATIENT
Start: 2021-02-12 | End: 2021-02-12 | Stop reason: SURG

## 2021-02-12 RX ORDER — BUPIVACAINE HYDROCHLORIDE 5 MG/ML
INJECTION, SOLUTION EPIDURAL; INTRACAUDAL AS NEEDED
Status: DISCONTINUED | OUTPATIENT
Start: 2021-02-12 | End: 2021-02-12 | Stop reason: HOSPADM

## 2021-02-12 RX ORDER — SODIUM CHLORIDE 0.9 % (FLUSH) 0.9 %
10 SYRINGE (ML) INJECTION AS NEEDED
Status: DISCONTINUED | OUTPATIENT
Start: 2021-02-12 | End: 2021-02-12 | Stop reason: HOSPADM

## 2021-02-12 RX ORDER — LIDOCAINE HYDROCHLORIDE 20 MG/ML
INJECTION, SOLUTION INTRAVENOUS AS NEEDED
Status: DISCONTINUED | OUTPATIENT
Start: 2021-02-12 | End: 2021-02-12 | Stop reason: SURG

## 2021-02-12 RX ORDER — SODIUM CHLORIDE, SODIUM LACTATE, POTASSIUM CHLORIDE, CALCIUM CHLORIDE 600; 310; 30; 20 MG/100ML; MG/100ML; MG/100ML; MG/100ML
1000 INJECTION, SOLUTION INTRAVENOUS CONTINUOUS
Status: DISCONTINUED | OUTPATIENT
Start: 2021-02-12 | End: 2021-02-12 | Stop reason: HOSPADM

## 2021-02-12 RX ORDER — ONDANSETRON 2 MG/ML
INJECTION INTRAMUSCULAR; INTRAVENOUS AS NEEDED
Status: DISCONTINUED | OUTPATIENT
Start: 2021-02-12 | End: 2021-02-12 | Stop reason: SURG

## 2021-02-12 RX ADMIN — PROPOFOL 100 MG: 10 INJECTION, EMULSION INTRAVENOUS at 11:10

## 2021-02-12 RX ADMIN — PROPOFOL 100 MG: 10 INJECTION, EMULSION INTRAVENOUS at 11:12

## 2021-02-12 RX ADMIN — LIDOCAINE HYDROCHLORIDE 60 MG: 20 INJECTION, SOLUTION INTRAVENOUS at 11:10

## 2021-02-12 RX ADMIN — INFLUENZA VIRUS VACCINE 0.5 ML: 15; 15; 15; 15 SUSPENSION INTRAMUSCULAR at 11:53

## 2021-02-12 RX ADMIN — FENTANYL CITRATE 100 MCG: 50 INJECTION INTRAMUSCULAR; INTRAVENOUS at 11:10

## 2021-02-12 RX ADMIN — KETOROLAC TROMETHAMINE 15 MG: 30 INJECTION, SOLUTION INTRAMUSCULAR at 11:10

## 2021-02-12 RX ADMIN — SODIUM CHLORIDE, POTASSIUM CHLORIDE, SODIUM LACTATE AND CALCIUM CHLORIDE 1000 ML: 600; 310; 30; 20 INJECTION, SOLUTION INTRAVENOUS at 10:15

## 2021-02-12 RX ADMIN — PROPOFOL 100 MG: 10 INJECTION, EMULSION INTRAVENOUS at 11:14

## 2021-02-12 RX ADMIN — ONDANSETRON 4 MG: 2 INJECTION INTRAMUSCULAR; INTRAVENOUS at 11:10

## 2021-02-12 RX ADMIN — CEFAZOLIN SODIUM 2 G: 2 SOLUTION INTRAVENOUS at 11:10

## 2021-02-12 NOTE — DISCHARGE INSTRUCTIONS
Please follow all post op instructions and follow up appointment time from your physician's office included in your discharge packet.  .   No pushing, pulling, tugging,  heavy lifting, or strenuous activity.  No major decision making, driving, or drinking alcoholic beverages for 24 hours. ( due to the medications you have  received)  Always use good hand hygiene/washing techniques.  NO driving while taking pain medications.    * if you have an incision:  Check your incision area every day for signs of infection.   Check for:  * more redness, swelling, or pain  *more fluid or blood  *warmth  *pus or bad smell  To assist you in voiding:  Drink plenty of fluids  Listen to running water while attempting to void.    If you are unable to urinate and you have an uncomfortable urge to void or it has been   6 hours since you were discharged, return to the Emergency Room    Keep the affected extremity elevated above  level of the heart.  Use your ice pack as instructed, do not use continuously.  Use your crutches and or sling as directed  Follow your physicians instructions as previously directed.

## 2021-02-12 NOTE — OP NOTE
[address], [address]  [place]  [address] [phone]      OPERATIVE REPORT           PATIENT NAME:   Cooper Luu                            YOB: 1975      PREOP DIAGNOSIS:  left Carpal Tunnel Syndrome.    POSTOP DIAGNOSIS:  left Carpal Tunnel Syndrome.    PROCEDURE:     left Carpal Tunnel Release (cpt 19064)    SURGEON:    Elier Del Angel M.D.    OPERATIVE TEAM:   Circulator: Annmarie Shin RN  Scrub Person: Jacob Pablo; Jackie Figueroa    ANESTHETIST:   ELICIA: Kevin Mosher CRNA    ANESTHESIA:   Choice    ESTIMATED BLOOD LOSS:  0 cc    TOURNIQUET TIME:   8  minutes @ 250 mm Hg    SPECIMENS:     None.    DRAINS:    None.    COMPLICATIONS:     None.    DISPOSITION:     Stable to recovery.    INDICATIONS AND NARRATIVE:   The patient presents for planned elective carpal tunnel release surgery to address the ongoing wrist and hand condition.  Risks and benefits of the surgery were discussed and an informed consent obtained.  Risks were discussed including, but not limited to, anesthesia risks, infection, nerve/vessel/tendon injury, chronic pain, persistent symptoms or possible recurrence of symptoms or limitations of the wrist and hand.  Goals include pain relief, improved sensation, strength, mobility and potential for improved function of the wrist and hand but no guarantee made as to these outcomes.      OPERATIVE PROCEDURE:  Antibiotic prophylaxis given.  Surgeon site marking and a time out were performed.  Anesthesia was effective and well tolerated.  The arm and hand was prepped and draped in the usual sterile fashion.      After sterile prep and drape and  arm exsanguination and with tourniquet inflation, an incision was made along the ulna border of the thenar crease of the hand in line with the radial border of the ring finger.  Careful soft tissue dissection and blunt Ragnell retractors were used to protect the soft tissues and  expose the transverse carpal tunnel ligament and protecting the superficial palmar arch.  While keeping a blunt Isaban under the ligament, the ligament was released proximally and distally, decompressing the median nerve and flexor tendons, taking care to identify and protect the recurrent branch of the median nerve and the superficial palmar arch with loupe magnification. All structures within the carpal tunnel could be easily visualized, and were intact within the carpal tunnel after release of the transverse carpal ligament. The wound was irrigated copiously and skin closure consisted of running 4-0 nylon sutures..The tourniquet was taken down and there was excellent hemostasis with bipolar electrocautery during the procedure and prompt return of normal perfusion at the conclusion of the procedure.  A sterile adaptic padded gauze, soft roll and two inch ace wrap forearm based dressing was applied.  Anesthesia was effective and well tolerated.  There were no complications of the procedure.  The patient was transferred in stable condition to the recovery room.

## 2021-02-12 NOTE — ANESTHESIA POSTPROCEDURE EVALUATION
Patient: Cooper Luu    Procedure Summary     Date: 02/12/21 Room / Location: [facility] OR  /  [facility] OR    Anesthesia Start: 1110 Anesthesia Stop: 1130    Procedure: Hand carpal tunnel release (Left Wrist) Diagnosis:       Preop examination      (Preop examination [Z01.818])    Surgeon: Elier Del Angel MD Provider: Kevin Mosher CRNA    Anesthesia Type: MAC ASA Status: 2          Anesthesia Type: MAC    Vitals  No vitals data found for the desired time range.          Post Anesthesia Care and Evaluation    Patient location during evaluation: bedside  Patient participation: complete - patient participated  Level of consciousness: awake  Pain score: 0  Pain management: adequate  Airway patency: patent  Anesthetic complications: No anesthetic complications  PONV Status: none  Cardiovascular status: acceptable and stable  Respiratory status: acceptable and room air  Hydration status: acceptable  No anesthesia care post op

## 2021-02-12 NOTE — ANESTHESIA PREPROCEDURE EVALUATION
Anesthesia Evaluation     Patient summary reviewed and Nursing notes reviewed   NPO Solid Status: > 8 hours  NPO Liquid Status: > 8 hours           Airway   Mallampati: I  TM distance: >3 FB  Neck ROM: full  No difficulty expected  Dental - normal exam     Pulmonary - normal exam   (+) sleep apnea,   Cardiovascular - normal exam    (+) hypertension,       Neuro/Psych  (+) numbness, psychiatric history Anxiety,     GI/Hepatic/Renal/Endo    (+) morbid obesity,      Musculoskeletal     Abdominal  - normal exam    Bowel sounds: normal.   Substance History - negative use     OB/GYN negative ob/gyn ROS         Other   arthritis,                      Anesthesia Plan    ASA 2     MAC     intravenous induction     Anesthetic plan, all risks, benefits, and alternatives have been provided, discussed and informed consent has been obtained with: patient.

## 2021-02-12 NOTE — H&P
Subjective      Patient ID: Cooper Luu is a 45 y.o. male  Pain of the Left Hand (Patient is here today for bilateral hand carpal tunnel syndrome, he states he had an EMG done 10 years ago at  with Dr. Petersen. 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 Petersen in [place], has been experiencing 8 to 10 years of paresthesias in both hands left more so than the right minimally improved with splinting and medications, he is noticing that the left hand is weak when he tries to rotate the left hand into pronation while lifting.  Has not noticed any change of motion either wrist, does complain of loss of feeling in the fingertips of the index long and slightly on the ring finger both hands.  Has had chronic neck pain no recent change in range of motion cervical spine no lower extremity paresthesias.  Was seen recently by Dr. Petersen and was told he is developing lumbar spinal stenosis.        Review of Systems   Constitutional: Negative for fever.   HENT: Negative for dental problem and voice change.    Eyes: Negative for visual disturbance.   Respiratory: Negative for shortness of breath.    Cardiovascular: Negative for chest pain.   Gastrointestinal: Negative for abdominal pain.   Genitourinary: Negative for dysuria.   Musculoskeletal: Positive for arthralgias. Negative for gait problem and joint swelling.   Skin: Negative for rash.   Neurological: Negative for speech difficulty.   Hematological: Does not bruise/bleed easily.   Psychiatric/Behavioral: Negative for confusion.         Medical History        Past Medical History:   Diagnosis Date   • Anxiety     •  Arthritis       Bothersome in knees and back    • Bell's palsy       Reported in 1986 and then again in 2001.  Reported he has residual weakness left side of face.    • Cystic acne     • Eczema     • GERD (gastroesophageal reflux disease)       Intermittent episodes   • History of fracture       Left tibia, left ankle (required surgical intervention)   • History of UTI 1997   • Infection, skin, staph       Patient reported history of several epsidoes but reported was not diagnosed as having MRSA   • Injury of back     • Low testosterone     • Sinus problem       Reported noted in the spring time   • Sleep apnea       Patient reported diagnosed around 2004 and was not Rx a CPAP.  Reported he has not had any other sleep studies since that time.    • Tattoos     • Wears glasses     • Wears partial dentures       Full upper plate - instructed no adhesives the DOS            Surgical History         Past Surgical History:   Procedure Laterality Date   • BACK SURGERY         x1 lower back   • CERVICAL DISCECTOMY ANTERIOR         x1   • CHOLECYSTECTOMY       • CYSTOSCOPY URETHROTOMY VISUAL INTERNAL N/A 10/7/2019     Procedure: CYSTOSCOPY DIRECT VISION URETHROTOMY  INTERNAL;  Surgeon: Julito Chow MD;  Location: [facility];  Service: Urology   • FRACTURE SURGERY Left       Tibia and ankle - required surgical intervention with placement of hardware that was later removed   • TONSILLECTOMY                     Family History   Problem Relation Age of Onset   • Cancer Mother     • Diabetes Mother     • Diabetes Father     • Heart attack Father           Social History   Social History            Socioeconomic History   • Marital status:        Spouse name: Not on file   • Number of children: Not on file   • Years of education: Not on file   • Highest education level: Not on file   Tobacco Use   • Smoking status: Current Every Day Smoker       Packs/day: 0.75       Years: 23.00       Pack years: 17.25       Types:  "Cigarettes   • Smokeless tobacco: Never Used   Substance and Sexual Activity   • Alcohol use: Yes       Comment: Rare use, reported no history of abuse   • Drug use: No   • Sexual activity: Yes       Partners: Female       Comment:            I have reviewed the medical and surgical history, family history, social history, medications, and/or allergies, and the review of systems of this report.          Allergies   Allergen Reactions   • Hydrocodone Hives, Itching, Nausea Only and Rash   • Sulfa Antibiotics Itching and Rash            Current Outpatient Medications:   •  busPIRone (BUSPAR) 10 MG tablet, Take 1 tablet by mouth 3 (Three) Times a Day As Needed (anxiety)., Disp: 90 tablet, Rfl: 5  •  tadalafil (CIALIS) 5 MG tablet, Take 1 tablet by mouth Daily., Disp: 90 tablet, Rfl: 3  •  testosterone cypionate (DEPO-TESTOSTERONE) 100 MG/ML solution injection, Inject 1.5 mL into the appropriate muscle as directed by prescriber 1 (One) Time Per Week., Disp: 10 mL, Rfl: 3  •  vitamin C (ASCORBIC ACID) 250 MG tablet, Take 250 mg by mouth Daily., Disp: , Rfl:   •  VITAMIN D, CHOLECALCIFEROL, PO, Take 2,000 Units by mouth., Disp: , Rfl:   •  Zinc 50 MG capsule, Take  by mouth., Disp: , Rfl:         Objective      Temp 96.6 °F (35.9 °C)   Ht 190.5 cm (75\")   Wt (!) 156 kg (344 lb)   BMI 43.00 kg/m²    Physical Exam  Constitutional: Patient is oriented to person, place, and time. Patient appears well-developed and well-nourished.   HENT:Head: Normocephalic and atraumatic.   Eyes: EOM are normal. Pupils are equal, round, and reactive to light.   Neck: Normal range of motion. Neck supple.   Cardiovascular: Normal rate.    Pulmonary/Chest: Effort normal and breath sounds normal.   Abdominal: Soft.   Neurological: Patient is alert and oriented to person, place, and time.   Skin: Skin is warm and dry.   Psychiatric: Patient has a normal mood and affect.   Nursing note and vitals reviewed.        [unfilled]   Left hand: " No thenar atrophy there is crepitus with range of motion at the wrist but no pain no signs of carpal instability, supination pronation is full there is crepitus noted with radial deviation dorsiflexion over the radial sided proximal carpal area.  Tinel's sign is positive for long finger paresthesias, carpal compression test is positive for index finger paresthesias, Phalen sign is negative no triggering of the fingers sensation decreased in the median nerve distribution.  Mo sign negative.     Right hand: No thenar atrophy, full painless wrist range of motion without crepitus, no triggering of the fingers full active range of motion all fingers, Tinel's Phalen sign both positive for paresthesias carpal compression test negative for paresthesias, Mo sign negative        Assessment/Plan      Review of Radiographic Studies:    Left wrist x-ray does confirm grade 3 arthrosis trapeziometacarpal region with minimal arthrosis noted at the radial ulnar joint but no sign of scapholunate diastases or significant radiocarpal osteoarthritis        Procedures     Diagnoses and all orders for this visit:     1. Carpal tunnel syndrome of left wrist (Primary)  -     XR Wrist 3+ View Left; Future  -     EMG & Nerve Conduction Test     2. Arthralgia of left wrist  -     XR Wrist 3+ View Left; Future     3. Bilateral carpal tunnel syndrome  -     EMG & Nerve Conduction Test        Orthopedic activities reviewed and patient expressed appreciation and Use brace as instructed        Recommendations/Plan:   Work/Activity Status: May perform usual activities as tolerated     Patient agreeable to call or return sooner for any concerns.                 Impression:  History of ACDF with Dr. Petersen 7-8 years ago, more than 10 years of symptoms of carpal tunnel syndrome both hands left more severe than right  Plan:  EMG study both hands continue night splinting, discuss surgical treatment after EMG complete     Addendum dated  2/12/2021:     EMG study did confirm severe carpal tunnel syndrome the left hand moderate carpal tunnel syndrome right hand.  Given continued symptoms of left hand carpal tunnel syndrome with no improvement with splinting patient wishes to proceed with surgical treatment.  I explained to him the nature of the operation risk complications limitations possibility for no improvement or worsening or chronic pain even despite the surgery was explained to him he wishes to proceed with surgical treatment.     I discussed with the patient the diagnosis, condition, natural history and treatment alternatives, both surgical and nonsurgical.  I reviewed the surgical procedural details using models, diagrams and reviewing x-ray findings.  I explained the nature of the operation, anesthesia methods and the postoperative recovery.  I explained risks and complications including but not limited to infection, bleeding, blood clotting, poor healing, chronic pain, stiffness, failure of the procedure, possible recurrence of the condition and anesthetic related risks.  The patient had opportunity to ask questions which were all answered to their satisfaction and decided to proceed with the plan for surgery.  I believe informed consent to proceed with the surgery was given verbally in my presence today.  The surgical consent form will be signed in the presence of the nursing staff prior to the surgery.

## 2021-02-15 ENCOUNTER — OFFICE VISIT (OUTPATIENT)
Dept: UROLOGY | Facility: CLINIC | Age: 46
End: 2021-02-15

## 2021-02-15 VITALS
OXYGEN SATURATION: 98 % | TEMPERATURE: 97.5 F | BODY MASS INDEX: 39.17 KG/M2 | WEIGHT: 315 LBS | HEIGHT: 75 IN | RESPIRATION RATE: 17 BRPM | HEART RATE: 77 BPM

## 2021-02-15 DIAGNOSIS — N52.9 ERECTILE DYSFUNCTION, UNSPECIFIED ERECTILE DYSFUNCTION TYPE: ICD-10-CM

## 2021-02-15 DIAGNOSIS — R79.89 LOW TESTOSTERONE: Primary | ICD-10-CM

## 2021-02-15 LAB
QT INTERVAL: 376 MS
QTC INTERVAL: 378 MS

## 2021-02-15 PROCEDURE — 99214 OFFICE O/P EST MOD 30 MIN: CPT | Performed by: UROLOGY

## 2021-02-15 RX ORDER — TESTOSTERONE CYPIONATE 100 MG/ML
150 INJECTION, SOLUTION INTRAMUSCULAR WEEKLY
Qty: 10 ML | Refills: 3 | Status: SHIPPED | OUTPATIENT
Start: 2021-02-15 | End: 2021-08-16

## 2021-02-15 RX ORDER — TADALAFIL 5 MG/1
5 TABLET ORAL DAILY
Qty: 90 TABLET | Refills: 3 | Status: SHIPPED | OUTPATIENT
Start: 2021-02-15 | End: 2021-09-08 | Stop reason: SDUPTHER

## 2021-02-15 NOTE — PROGRESS NOTES
"Chief Complaint  LUTS  Low testosterone  Erectile dysfunction      HPI  Mr. Luu is a 46 y.o. male former [employer], deployed in [place] with history of obesity who presents for follow-up on testosterone therapy.    His energy and libido have been good. He is still taking Cialis and is requesting a refill of his testosterone and Cialis. He denies any chest swelling, leg swelling, or shortness of breath.    Additionally, he reports he is voiding well and has had good result following the direct vision internal urethrotomy. He describes the surgery as \"life changing\" and states he only wakes to void 1 time nightly. The patient has undergone a recent carpal tunnel release. He has been fasting and reports a weight loss of 30 pounds.    For historical review,  Primary symptom includes: Decreased libido, nonexistent energy, difficulty sleeping, mental clarity issues.  He is mostly concerned about low testosterone today.  His main urinary issues are daily urinary frequency, nocturia, weak stream.  Patient denies dysuria or hematuria.  Onset was many years ago in 2012 ago.  He is unsure if there is a temporal relationship between his deployment and periods of extreme stress with his development of low testosterone.  He denies any past anabolic steroid or illicit testosterone usage.  He does admit to a history of back injury, with surgeries, nerve impingement, and  orthopedic hardware.  He says that if he sits for too long his leg becomes numb.  He admits to weight gain and decrease in size of penis.  He has had multiple issues with with recurrent skin infections and cellulitis of the lower extremities and groin in the past.  Previous treatments include: AndroGel, testosterone cypionate injections      Past Medical History  Past Medical History:   Diagnosis Date   • Anxiety    • Arthritis     Bothersome in knees and back    • Bell's palsy     Reported in 1986 and then again in 2001.  Reported he has residual weakness left side " of face.    • Cystic acne    • Eczema    • History of fracture     Left tibia, left ankle (required surgical intervention), skull    • History of UTI 1997   • Hypertension     Patient reported slightly elevated and that he has never been diagnosed with HTN.  Did report that he is trying to loose weight to help the intermittent HTN that has been noted previously.   • Infection, skin, staph     Patient reported history of several epsidoes but reported was not diagnosed as having MRSA   • Injury of back    • Low testosterone    • Sinus problem     Reported noted in the spring time   • Sleep apnea 02/10/2021    Patient reported diagnosed around 2006 and was not Rx a CPAP.  Reported he had a sleep study several weeks ago but has not heard results of testing.    • Tattoos    • Wears contact lenses     Patient advised that these will have to be removed for surgery   • Wears glasses    • Wears glasses    • Wears partial dentures     Full upper plate - instructed no adhesives the DOS       Past Surgical History  Past Surgical History:   Procedure Laterality Date   • BACK SURGERY      x1 lower back   • CARPAL TUNNEL RELEASE Left 2/12/2021    Procedure: Hand carpal tunnel release;  Surgeon: Elier Del Angel MD;  Location: [facility];  Service: Orthopedics;  Laterality: Left;   • CERVICAL DISCECTOMY ANTERIOR      x1   • CHOLECYSTECTOMY     • CYSTOSCOPY URETHROTOMY VISUAL INTERNAL N/A 10/7/2019    Procedure: CYSTOSCOPY DIRECT VISION URETHROTOMY  INTERNAL;  Surgeon: Julito Chow MD;  Location: [facility];  Service: Urology   • FRACTURE SURGERY Left     Tibia and ankle - required surgical intervention with placement of hardware that was later removed   • TONSILLECTOMY     • WISDOM TOOTH EXTRACTION         Medications    Current Outpatient Medications:   •  busPIRone (BUSPAR) 10 MG tablet, Take 1 tablet by mouth 3 (Three) Times a Day As Needed (anxiety)., Disp: 90 tablet, Rfl: 5  •  saccharomyces boulardii (FLORASTOR) 250 MG  capsule, Take 250 mg by mouth Daily. Probiotic once daily, Disp: , Rfl:   •  tadalafil (CIALIS) 5 MG tablet, Take 1 tablet by mouth Daily., Disp: 90 tablet, Rfl: 3  •  testosterone cypionate (DEPO-TESTOSTERONE) 100 MG/ML solution injection, Inject 1.5 mL into the appropriate muscle as directed by prescriber 1 (One) Time Per Week., Disp: 10 mL, Rfl: 3  •  vitamin C (ASCORBIC ACID) 250 MG tablet, Take 1,000 mg by mouth Daily., Disp: , Rfl:   •  VITAMIN D, CHOLECALCIFEROL, PO, Take 2,000 Units by mouth Daily., Disp: , Rfl:   •  Zinc 50 MG capsule, Take 50 mg by mouth Daily., Disp: , Rfl:     Allergies  Allergies   Allergen Reactions   • Hydrocodone Hives, Itching, Nausea Only and Rash   • Sulfa Antibiotics Itching and Rash       Social History  Social History     Socioeconomic History   • Marital status:      Spouse name: Not on file   • Number of children: Not on file   • Years of education: Not on file   • Highest education level: Not on file   Tobacco Use   • Smoking status: Current Every Day Smoker     Packs/day: 0.75     Years: 24.00     Pack years: 18.00     Types: Cigarettes   • Smokeless tobacco: Never Used   Substance and Sexual Activity   • Alcohol use: Yes     Comment: Rare use, reported no history of abuse   • Drug use: No   • Sexual activity: Yes     Partners: Female     Comment:        Family History  He has no family history of prostate cancer    Review of Systems  Constitutional: No fevers or chills; positive decreased energy  Skin: Positive for multiple ingrown hairs and boils of lower extremity  Endocrine: No heat/cold intolerance   Cardiovascular: Negative for chest pain or dyspnea on exertion  Respiratory: Negative for shortness of breath or wheezing  Gastrointestinal: No nausea or vomiting  Genitourinary: Negative for current gross hematuria.  Musculoskeletal: No flank pain; positive intermittent low back pain  Neurological:  Negative for frequent headaches or dizziness  Lymph/Heme:  "Negative for leg swelling or calf pain.    Physical Exam  Visit Vitals  Pulse 77   Temp 97.5 °F (36.4 °C)   Resp 17   Ht 190.5 cm (75\")   Wt (!) 156 kg (344 lb)   SpO2 98%   BMI 43.00 kg/m²     Constitutional: NAD, WDWN.   HEENT: NCAT. Conjunctivae normal.  MMM.    Cardiovascular: Regular rate.  Pulmonary/Chest: Respirations are even and non-labored bilaterally.  Abdominal: Soft. No distension, tenderness, masses or guarding. No CVA tenderness.  Neurological: A + O x 3.  Cranial Nerves II-XII grossly intact. Normal gait.  Extremities: TISHA x 4, Warm. No clubbing.  No cyanosis.    Skin: Pink, warm and dry.  Multiple ingrown hairs and excoriations from past follicular infections  Psychiatric:  Normal mood and affect      Labs  Lab Results   Component Value Date    PSA 1.240 12/18/2020    PSA 0.497 08/27/2019    PSA 0.500 05/24/2018       Brief Urine Lab Results  (Last result in the past 365 days)      Color   Clarity   Blood   Leuk Est   Nitrite   Protein   CREAT   Urine HCG        12/07/20 1159 Yellow Slightly Cloudy Small Trace Negative Negative             Results for BEATRIZ LUU (MRN [id number]) as of 2/15/2021 10:53   Ref. Range 1/28/2021 12:52 2/10/2021 12:53   Estradiol Latest Units: pg/mL 47.0    Sex Hormone Binding Globulin Latest Ref Range: 16.5 - 55.9 nmol/L 15.9 (L)    Testosterone, Total Latest Ref Range: 264 - 916 ng/dL 497    Testosterone, Free Latest Ref Range: 6.8 - 21.5 pg/mL 10.3    WBC Latest Ref Range: 3.40 - 10.80 10*3/mm3  10.04   RBC Latest Ref Range: 4.14 - 5.80 10*6/mm3  5.61   Hemoglobin Latest Ref Range: 13.0 - 17.7 g/dL 18.6 (H) 17.7   Hematocrit Latest Ref Range: 37.5 - 51.0 % 52.7 (H) 50.6         Assessment  Mr. Luu is a 46 y.o. male who presents with LUTS, primarily weak stream, urinary frequency, nocturia, as well as low libido and low energy, low testosterone.  He is status post DVIU with great resolution of his urinary symptoms.  He is currently on testosterone cypionate " weekly self injections. The patient is doing well on all of his treatments so far.       Plan  1.  Continue weekly testosterone cypionate injections 150mg; I refilled this  2.  Labs in 6 months  3.  I refilled his daily tadalafil  4. Phlebotomy, 3 times per year.    Scribed for Julito Chow MD by ANDREA MAY.  2/15/2021  14:20 EST    I Julito Chow MD have personally performed the services described in this document as scribed by the above individual, and it is both accurate and complete.     Julito Chow MD  2/17/2021  11:34 EST          Julito Chow MD

## 2021-02-25 ENCOUNTER — OFFICE VISIT (OUTPATIENT)
Dept: ORTHOPEDIC SURGERY | Facility: CLINIC | Age: 46
End: 2021-02-25

## 2021-02-25 DIAGNOSIS — G56.03 BILATERAL CARPAL TUNNEL SYNDROME: Primary | ICD-10-CM

## 2021-02-25 DIAGNOSIS — Z98.1 S/P CERVICAL SPINAL FUSION: ICD-10-CM

## 2021-02-25 PROCEDURE — 99024 POSTOP FOLLOW-UP VISIT: CPT | Performed by: ORTHOPAEDIC SURGERY

## 2021-02-25 RX ORDER — TESTOSTERONE CYPIONATE 200 MG/ML
INJECTION, SOLUTION INTRAMUSCULAR
COMMUNITY
Start: 2021-02-15 | End: 2021-08-16

## 2021-02-25 NOTE — PROGRESS NOTES
Subjective   Patient ID: Cooper Luu is a 46 y.o. male  Follow-up of the Left Hand (Pt is here for a 2 week f/u pt had Hand carpal tunnel release on his left side on 2/12/21 patient states he has zero pain at this time and no swelling )             History of Present Illness  He is very happy and satisfied with his relief of paresthesias in left hand status post carpal tunnel surgery no pain no incisional issues.  He does have a chronic neck pain which is worsened recently has had a history of cervical fusion 10 years ago with Dr. Petersen and he would prefer to stay within the [facility] and have neurosurgery follow his neck condition.  He has noticed some pain radiating down the left upper shoulder towards the biceps area with some twitching in the left biceps as well.      Review of Systems   Constitutional: Negative for fever.   HENT: Negative for dental problem and voice change.    Eyes: Negative for visual disturbance.   Respiratory: Negative for shortness of breath.    Cardiovascular: Negative for chest pain.   Gastrointestinal: Negative for abdominal pain.   Genitourinary: Negative for dysuria.   Musculoskeletal: Negative for arthralgias, gait problem and joint swelling.   Skin: Negative for rash.   Neurological: Negative for speech difficulty.   Hematological: Does not bruise/bleed easily.   Psychiatric/Behavioral: Negative for confusion.       Past Medical History:   Diagnosis Date   • Anxiety    • Arthritis     Bothersome in knees and back    • Bell's palsy     Reported in 1986 and then again in 2001.  Reported he has residual weakness left side of face.    • Cystic acne    • Eczema    • History of fracture     Left tibia, left ankle (required surgical intervention), skull    • History of UTI 1997   • Hypertension     Patient reported slightly elevated and that he has never been diagnosed with HTN.  Did report that he is trying to loose weight to help the intermittent HTN that has been noted  previously.   • Infection, skin, staph     Patient reported history of several epsidoes but reported was not diagnosed as having MRSA   • Injury of back    • Low testosterone    • Sinus problem     Reported noted in the spring time   • Sleep apnea 02/10/2021    Patient reported diagnosed around 2006 and was not Rx a CPAP.  Reported he had a sleep study several weeks ago but has not heard results of testing.    • Tattoos    • Wears contact lenses     Patient advised that these will have to be removed for surgery   • Wears glasses    • Wears glasses    • Wears partial dentures     Full upper plate - instructed no adhesives the DOS        Past Surgical History:   Procedure Laterality Date   • BACK SURGERY      x1 lower back   • CARPAL TUNNEL RELEASE Left 2/12/2021    Procedure: Hand carpal tunnel release;  Surgeon: Elier Del Angel MD;  Location: [facility];  Service: Orthopedics;  Laterality: Left;   • CERVICAL DISCECTOMY ANTERIOR      x1   • CHOLECYSTECTOMY     • CYSTOSCOPY URETHROTOMY VISUAL INTERNAL N/A 10/7/2019    Procedure: CYSTOSCOPY DIRECT VISION URETHROTOMY  INTERNAL;  Surgeon: Julito Chow MD;  Location: [facility];  Service: Urology   • FRACTURE SURGERY Left     Tibia and ankle - required surgical intervention with placement of hardware that was later removed   • TONSILLECTOMY     • WISDOM TOOTH EXTRACTION         Family History   Problem Relation Age of Onset   • Cancer Mother    • Diabetes Mother    • Diabetes Father    • Heart attack Father        Social History     Socioeconomic History   • Marital status:      Spouse name: Not on file   • Number of children: Not on file   • Years of education: Not on file   • Highest education level: Not on file   Tobacco Use   • Smoking status: Current Every Day Smoker     Packs/day: 0.75     Years: 24.00     Pack years: 18.00     Types: Cigarettes   • Smokeless tobacco: Never Used   Substance and Sexual Activity   • Alcohol use: Yes     Comment: Rare  use, reported no history of abuse   • Drug use: No   • Sexual activity: Yes     Partners: Female     Comment:        I have reviewed the medical and surgical history, family history, social history, medications, and/or allergies, and the review of systems of this report.    Allergies   Allergen Reactions   • Hydrocodone Hives, Itching, Nausea Only and Rash   • Sulfa Antibiotics Itching and Rash         Current Outpatient Medications:   •  busPIRone (BUSPAR) 10 MG tablet, Take 1 tablet by mouth 3 (Three) Times a Day As Needed (anxiety)., Disp: 90 tablet, Rfl: 5  •  saccharomyces boulardii (FLORASTOR) 250 MG capsule, Take 250 mg by mouth Daily. Probiotic once daily, Disp: , Rfl:   •  tadalafil (CIALIS) 5 MG tablet, Take 1 tablet by mouth Daily., Disp: 90 tablet, Rfl: 3  •  testosterone cypionate (DEPO-TESTOSTERONE) 100 MG/ML solution injection, Inject 1.5 mL into the appropriate muscle as directed by prescriber 1 (One) Time Per Week., Disp: 10 mL, Rfl: 3  •  Testosterone Cypionate (DEPOTESTOTERONE CYPIONATE) 200 MG/ML injection, , Disp: , Rfl:   •  vitamin C (ASCORBIC ACID) 250 MG tablet, Take 1,000 mg by mouth Daily., Disp: , Rfl:   •  VITAMIN D, CHOLECALCIFEROL, PO, Take 2,000 Units by mouth Daily., Disp: , Rfl:   •  Zinc 50 MG capsule, Take 50 mg by mouth Daily., Disp: , Rfl:     Objective   There were no vitals taken for this visit.   Physical Exam  Constitutional: Patient is oriented to person, place, and time. Patient appears well-developed and well-nourished.   HENT:Head: Normocephalic and atraumatic.   Eyes: EOM are normal. Pupils are equal, round, and reactive to light.   Neck: Normal range of motion. Neck supple.   Cardiovascular: Normal rate.    Pulmonary/Chest: Effort normal and breath sounds normal.   Abdominal: Soft.   Neurological: Patient is alert and oriented to person, place, and time.   Skin: Skin is warm and dry.   Psychiatric: Patient has a normal mood and affect.   Nursing note and vitals  reviewed.       [unfilled]   Left hand: Incisional area well-healed sutures removed Steri-Strips applied sensation intact in median nerve distribution no sign of significant redness erythema or ecchymosis in the incisional area.  Assessment/Plan   Review of Radiographic Studies:    No new imaging done today.      Procedures     Diagnoses and all orders for this visit:    1. Bilateral carpal tunnel syndrome (Primary)    2. S/P cervical spinal fusion  -     Ambulatory Referral to Neurosurgery       Orthopedic activities reviewed and patient expressed appreciation      Recommendations/Plan:   Work/Activity Status: May perform usual activities as tolerated    Patient agreeable to call or return sooner for any concerns.       He was instructed regarding wound care    Impression: Status post left hand carpal tunnel release, continued right hand carpal tunnel syndrome, history of cervical fusion 10 years ago with Dr. Petersen    Plan: Refer to neurosurgery [facility] for follow-up cervical fusion, he will call to schedule right hand carpal tunnel release probably for April timeframe  I

## 2021-02-26 ENCOUNTER — TELEPHONE (OUTPATIENT)
Dept: ORTHOPEDIC SURGERY | Facility: CLINIC | Age: 46
End: 2021-02-26

## 2021-02-26 NOTE — TELEPHONE ENCOUNTER
LM for pt to call back. Please find out where he had his cervical fusion and if he has had recent MRI.  Needs MRI for neurosurgical referral.

## 2021-03-02 ENCOUNTER — TELEPHONE (OUTPATIENT)
Dept: ORTHOPEDIC SURGERY | Facility: CLINIC | Age: 46
End: 2021-03-02

## 2021-03-02 DIAGNOSIS — M54.12 CERVICAL RADICULOPATHY: ICD-10-CM

## 2021-03-02 DIAGNOSIS — Z98.1 S/P CERVICAL SPINAL FUSION: Primary | ICD-10-CM

## 2021-03-02 NOTE — TELEPHONE ENCOUNTER
LVM for patient to tell him an MRI order has been put in along with a series of x-rays to be performed at the hospital.

## 2021-05-18 ENCOUNTER — TELEMEDICINE (OUTPATIENT)
Dept: INTERNAL MEDICINE | Facility: CLINIC | Age: 46
End: 2021-05-18

## 2021-05-18 DIAGNOSIS — U07.1 COVID-19: ICD-10-CM

## 2021-05-18 DIAGNOSIS — R21 RASH: ICD-10-CM

## 2021-05-18 DIAGNOSIS — M25.50 POLYARTHRALGIA: Primary | ICD-10-CM

## 2021-05-18 PROCEDURE — 99213 OFFICE O/P EST LOW 20 MIN: CPT | Performed by: PHYSICIAN ASSISTANT

## 2021-05-18 NOTE — PROGRESS NOTES
You have chosen to receive care through a telehealth visit.  Do you consent to use a video/audio connection for your medical care today? Yes  Active parties: Shaina Fields PA-C, Samantha Govea CMA, Cooper Luu         Follow Up Office Visit      Patient Name: Cooper Luu  : 1975   MRN: [id number]     Chief Complaint:    Chief Complaint   Patient presents with   • Covid-19 Home Monitoring Video Visit     BODY ACHES       History of Present Illness: Cooper Luu is a 46 y.o. male who presents today via video visit with concern of Covid-19 symptoms as well as long-term polyarthralgia. He was exposed to COVID-19 by a coworker on 5/10/2021 and began experiencing diarrhea, cough and body aches on 5/15/2021.  He was tested for COVID-19 the following day which returned as positive.  Since then he he has continued to have bothersome body aches particularly in both hips and the left knee.  Body aches have caused sleep disturbances.  He began taking ibuprofen 800 mg last night and again around 10:00 this morning which has helped the body aches be more tolerable.  He denies any fever.  Diarrhea and cough have already resolved.    He has been bothered by frequent pain in bilateral knees, left elbow, right hip, bilateral hands and bilateral wrists long-term with worsening over the last year or so.  He is also previously been diagnosed with eczema on both hands but is now questioning whether or not it may have been psoriasis.  He very frequently has burning and itching sensation in bilateral hands but does not always have a visible rash.  The burning and itching no longer responds to topical steroid use.  He is requesting referral to see a rheumatologist at the [facility].      Subjective      I have reviewed and the following portions of the patient's history were updated as appropriate: past family history, past medical history, past social history, past surgical history and problem  list.      Current Outpatient Medications:   •  busPIRone (BUSPAR) 10 MG tablet, Take 1 tablet by mouth 3 (Three) Times a Day As Needed (anxiety)., Disp: 90 tablet, Rfl: 5  •  saccharomyces boulardii (FLORASTOR) 250 MG capsule, Take 250 mg by mouth Daily. Probiotic once daily, Disp: , Rfl:   •  tadalafil (CIALIS) 5 MG tablet, Take 1 tablet by mouth Daily., Disp: 90 tablet, Rfl: 3  •  testosterone cypionate (DEPO-TESTOSTERONE) 100 MG/ML solution injection, Inject 1.5 mL into the appropriate muscle as directed by prescriber 1 (One) Time Per Week., Disp: 10 mL, Rfl: 3  •  Testosterone Cypionate (DEPOTESTOTERONE CYPIONATE) 200 MG/ML injection, , Disp: , Rfl:   •  vitamin C (ASCORBIC ACID) 250 MG tablet, Take 1,000 mg by mouth Daily., Disp: , Rfl:   •  VITAMIN D, CHOLECALCIFEROL, PO, Take 2,000 Units by mouth Daily., Disp: , Rfl:   •  Zinc 50 MG capsule, Take 50 mg by mouth Daily., Disp: , Rfl:     Allergies   Allergen Reactions   • Hydrocodone Hives, Itching, Nausea Only and Rash   • Sulfa Antibiotics Itching and Rash       Objective     Physical Exam:  Vital Signs: There were no vitals filed for this visit.  There is no height or weight on file to calculate BMI.    Physical Exam  Nursing note reviewed.   Constitutional:       General: He is not in acute distress.     Appearance: He is well-developed. He is obese. He is not ill-appearing, toxic-appearing or diaphoretic.   HENT:      Head: Normocephalic and atraumatic.      Right Ear: External ear normal.      Left Ear: External ear normal.   Eyes:      General: No scleral icterus.        Right eye: No discharge.         Left eye: No discharge.   Pulmonary:      Effort: Pulmonary effort is normal. No respiratory distress.   Musculoskeletal:      Cervical back: Normal range of motion. No pain with movement.   Skin:     Coloration: Skin is not jaundiced or pale.      Findings: No erythema or rash.   Neurological:      Mental Status: He is alert and oriented to person,  place, and time.      Coordination: Coordination normal.   Psychiatric:         Mood and Affect: Mood normal.         Behavior: Behavior normal.         Thought Content: Thought content normal.         Judgment: Judgment normal.         Common labs    Common Labsle 12/18/20 12/18/20 12/18/20 12/18/20 12/18/20 1/28/21 2/10/21 2/10/21    0954 0954 0954 0954 0954  1253 1253   Glucose        86   Glucose 100 (A)          BUN 9       7   Creatinine 1.16       0.93   eGFR Non  Am 69       88   eGFR African Am 83          Sodium 137       138   Potassium 4.7       3.8   Chloride 100       103   Calcium 9.6       9.2   Total Protein 7.8          Albumin 4.50          Total Bilirubin 0.8          Alkaline Phosphatase 68          AST (SGOT) 24          ALT (SGPT) 38          WBC     10.59  10.04    Hemoglobin     18.1 (A) 18.6 (A) 17.7    Hematocrit     52.0 (A) 52.7 (A) 50.6    Platelets     305  271    Total Cholesterol   199        Triglycerides   168 (A)        HDL Cholesterol   27 (A)        LDL Cholesterol    141 (A)        Hemoglobin A1C  5.50         PSA    1.240       (A) Abnormal value       Comments are available for some flowsheets but are not being displayed.               Assessment / Plan      Assessment/Plan:   Diagnoses and all orders for this visit:    1. Polyarthralgia (Primary)  -     Ambulatory Referral to Rheumatology    2. Rash  -     Ambulatory Referral to Rheumatology    3. COVID-19  Diagnosed at an outside facility with positive test on 5/16/2021.  Recommended use of Tylenol with sparing use of NSAIDs as needed for management of body aches associated with COVID-19.       Call or return to clinic if symptoms worsen.      I spent 20 minutes caring for Cooper on this date of service. This time includes time spent by me in the following activities:preparing for the visit, obtaining and/or reviewing a separately obtained history, performing a medically appropriate examination and/or evaluation ,  counseling and educating the patient/family/caregiver, referring and communicating with other health care professionals  and documenting information in the medical record    Follow Up:   No follow-ups on file.    Patient was given instructions and counseling regarding his condition or for health maintenance advice. Please see specific information pulled into the AVS if appropriate.     Shaina Fields PA-C  [facility]

## 2021-08-07 ENCOUNTER — LAB (OUTPATIENT)
Dept: LAB | Facility: HOSPITAL | Age: 46
End: 2021-08-07

## 2021-08-07 DIAGNOSIS — R79.89 LOW TESTOSTERONE: ICD-10-CM

## 2021-08-07 LAB
ESTRADIOL SERPL HS-MCNC: 79.4 PG/ML
HCT VFR BLD AUTO: 52.6 % (ref 37.5–51)
HGB BLD-MCNC: 17 G/DL (ref 13–17.7)

## 2021-08-07 PROCEDURE — 36415 COLL VENOUS BLD VENIPUNCTURE: CPT

## 2021-08-07 PROCEDURE — 84403 ASSAY OF TOTAL TESTOSTERONE: CPT

## 2021-08-07 PROCEDURE — 84270 ASSAY OF SEX HORMONE GLOBUL: CPT

## 2021-08-07 PROCEDURE — 82670 ASSAY OF TOTAL ESTRADIOL: CPT

## 2021-08-07 PROCEDURE — 84402 ASSAY OF FREE TESTOSTERONE: CPT

## 2021-08-07 PROCEDURE — 85014 HEMATOCRIT: CPT

## 2021-08-07 PROCEDURE — 85018 HEMOGLOBIN: CPT

## 2021-08-09 ENCOUNTER — TELEPHONE (OUTPATIENT)
Dept: UROLOGY | Facility: CLINIC | Age: 46
End: 2021-08-09

## 2021-08-09 NOTE — TELEPHONE ENCOUNTER
Good Morning, I got my bloodwork done for my upcoming visit and the results are beginning to populate in MyChart. My estradiol is extremely high so I was inquiring as to whether or not I should pause taking my weekly testosterone shot until we have the schedule visit on the 16th?  Thank you.

## 2021-08-09 NOTE — TELEPHONE ENCOUNTER
----- Message from Cooper Luu sent at 8/8/2021  3:50 PM EDT -----  Regarding: Prescription Question  Contact: [phone]  Good Morning, I got my bloodwork done for my upcoming visit and the results are beginning to populate in MyChart. My estradiol is extremely high so I was inquiring as to whether or not I should pause taking my weekly testosterone shot until we have the schedule visit on the 16th?  Thank you.

## 2021-08-12 LAB
SHBG SERPL-SCNC: 13.8 NMOL/L (ref 16.5–55.9)
TESTOST FREE SERPL-MCNC: 21.4 PG/ML (ref 6.8–21.5)
TESTOST SERPL-MCNC: 979 NG/DL (ref 264–916)

## 2021-08-16 ENCOUNTER — OFFICE VISIT (OUTPATIENT)
Dept: UROLOGY | Facility: CLINIC | Age: 46
End: 2021-08-16

## 2021-08-16 VITALS
DIASTOLIC BLOOD PRESSURE: 85 MMHG | WEIGHT: 315 LBS | SYSTOLIC BLOOD PRESSURE: 138 MMHG | BODY MASS INDEX: 39.17 KG/M2 | HEART RATE: 66 BPM | OXYGEN SATURATION: 97 % | HEIGHT: 75 IN | TEMPERATURE: 97.5 F

## 2021-08-16 DIAGNOSIS — R79.89 LOW TESTOSTERONE: Primary | ICD-10-CM

## 2021-08-16 PROCEDURE — 99214 OFFICE O/P EST MOD 30 MIN: CPT | Performed by: UROLOGY

## 2021-08-16 RX ORDER — ANASTROZOLE 1 MG/1
1 TABLET ORAL WEEKLY
Qty: 30 TABLET | Refills: 3 | Status: SHIPPED | OUTPATIENT
Start: 2021-08-16 | End: 2021-11-02

## 2021-08-16 RX ORDER — TESTOSTERONE CYPIONATE 100 MG/ML
100 INJECTION, SOLUTION INTRAMUSCULAR WEEKLY
Qty: 10 ML | Refills: 1 | Status: SHIPPED | OUTPATIENT
Start: 2021-08-16 | End: 2021-12-27 | Stop reason: SDUPTHER

## 2021-08-16 NOTE — PROGRESS NOTES
Chief Complaint  LUTS  Low testosterone  Erectile dysfunction      HPI  Mr. Luu is a 46 y.o. male former [employer], deployed in [place] with history of obesity who presents for follow-up on testosterone therapy.    His energy has been good but libido has been lower.  Estradiol level is quite high.  He is still taking Cialis and this is working well for him.  He does have a history of DVIU and is voiding well.  No nocturia.. He denies any chest swelling, leg swelling, or shortness of breath.      Past Medical History  Past Medical History:   Diagnosis Date   • Anxiety    • Arthritis     Bothersome in knees and back    • Bell's palsy     Reported in 1986 and then again in 2001.  Reported he has residual weakness left side of face.    • Cystic acne    • Eczema    • History of fracture     Left tibia, left ankle (required surgical intervention), skull    • History of UTI 1997   • Hypertension     Patient reported slightly elevated and that he has never been diagnosed with HTN.  Did report that he is trying to loose weight to help the intermittent HTN that has been noted previously.   • Infection, skin, staph     Patient reported history of several epsidoes but reported was not diagnosed as having MRSA   • Injury of back    • Low testosterone    • Sinus problem     Reported noted in the spring time   • Sleep apnea 02/10/2021    Patient reported diagnosed around 2006 and was not Rx a CPAP.  Reported he had a sleep study several weeks ago but has not heard results of testing.    • Tattoos    • Wears contact lenses     Patient advised that these will have to be removed for surgery   • Wears glasses    • Wears glasses    • Wears partial dentures     Full upper plate - instructed no adhesives the DOS       Past Surgical History  Past Surgical History:   Procedure Laterality Date   • BACK SURGERY      x1 lower back   • CARPAL TUNNEL RELEASE Left 2/12/2021    Procedure: Hand carpal tunnel release;  Surgeon: Elier Del Angel MD;   Location: [facility];  Service: Orthopedics;  Laterality: Left;   • CERVICAL DISCECTOMY ANTERIOR      x1   • CHOLECYSTECTOMY     • CYSTOSCOPY URETHROTOMY VISUAL INTERNAL N/A 10/7/2019    Procedure: CYSTOSCOPY DIRECT VISION URETHROTOMY  INTERNAL;  Surgeon: Julito Chow MD;  Location: [facility];  Service: Urology   • FRACTURE SURGERY Left     Tibia and ankle - required surgical intervention with placement of hardware that was later removed   • TONSILLECTOMY     • WISDOM TOOTH EXTRACTION         Medications    Current Outpatient Medications:   •  saccharomyces boulardii (FLORASTOR) 250 MG capsule, Take 250 mg by mouth Daily. Probiotic once daily, Disp: , Rfl:   •  tadalafil (CIALIS) 5 MG tablet, Take 1 tablet by mouth Daily., Disp: 90 tablet, Rfl: 3  •  testosterone cypionate (DEPO-TESTOSTERONE) 100 MG/ML solution injection, Inject 1.5 mL into the appropriate muscle as directed by prescriber 1 (One) Time Per Week., Disp: 10 mL, Rfl: 3  •  vitamin C (ASCORBIC ACID) 250 MG tablet, Take 1,000 mg by mouth Daily., Disp: , Rfl:   •  VITAMIN D, CHOLECALCIFEROL, PO, Take 2,000 Units by mouth Daily., Disp: , Rfl:   •  Zinc 50 MG capsule, Take 50 mg by mouth Daily., Disp: , Rfl:   •  busPIRone (BUSPAR) 10 MG tablet, Take 1 tablet by mouth 3 (Three) Times a Day As Needed (anxiety)., Disp: 90 tablet, Rfl: 5  •  Testosterone Cypionate (DEPOTESTOTERONE CYPIONATE) 200 MG/ML injection, , Disp: , Rfl:     Allergies  Allergies   Allergen Reactions   • Hydrocodone Hives, Itching, Nausea Only and Rash   • Sulfa Antibiotics Itching and Rash       Social History  Social History     Socioeconomic History   • Marital status:      Spouse name: Not on file   • Number of children: Not on file   • Years of education: Not on file   • Highest education level: Not on file   Tobacco Use   • Smoking status: Current Every Day Smoker     Packs/day: 0.75     Years: 24.00     Pack years: 18.00     Types: Cigarettes   • Smokeless tobacco:  "Never Used   Vaping Use   • Vaping Use: Never used   Substance and Sexual Activity   • Alcohol use: Yes     Comment: Rare use, reported no history of abuse   • Drug use: No   • Sexual activity: Yes     Partners: Female     Comment:        Family History  He has no family history of prostate cancer    Review of Systems  Constitutional: No fevers or chills; positive decreased energy  Skin: Positive for multiple ingrown hairs and boils of lower extremity  Endocrine: No heat/cold intolerance   Cardiovascular: Negative for chest pain or dyspnea on exertion  Respiratory: Negative for shortness of breath or wheezing  Gastrointestinal: No nausea or vomiting  Genitourinary: Negative for current gross hematuria.  Musculoskeletal: No flank pain; positive intermittent low back pain  Neurological:  Negative for frequent headaches or dizziness  Lymph/Heme: Negative for leg swelling or calf pain.    Physical Exam  Visit Vitals  /85   Pulse 66   Temp 97.5 °F (36.4 °C)   Ht 190.5 cm (75\")   Wt (!) 150 kg (330 lb)   SpO2 97%   BMI 41.25 kg/m²     Constitutional: NAD, WDWN.   HEENT: NCAT. Conjunctivae normal.  MMM.    Cardiovascular: Regular rate.  Pulmonary/Chest: Respirations are even and non-labored bilaterally.  Abdominal: Soft. No distension, tenderness, masses or guarding. No CVA tenderness.  Neurological: A + O x 3.  Cranial Nerves II-XII grossly intact. Normal gait.  Extremities: TISHA x 4, Warm. No clubbing.  No cyanosis.    Skin: Pink, warm and dry.  Multiple ingrown hairs and excoriations from past follicular infections  Psychiatric:  Normal mood and affect      Labs  Lab Results   Component Value Date    PSA 1.240 12/18/2020    PSA 0.497 08/27/2019    PSA 0.500 05/24/2018       Brief Urine Lab Results  (Last result in the past 365 days)      Color   Clarity   Blood   Leuk Est   Nitrite   Protein   CREAT   Urine HCG        12/07/20 1159 Yellow Slightly Cloudy Small Trace Negative Negative             Results for " BEATRIZ LUU (MRN [id number]) as of 8/16/2021 09:37   Ref. Range 8/7/2021 10:02   Estradiol Latest Units: pg/mL 79.4   Sex Hormone Binding Globulin Latest Ref Range: 16.5 - 55.9 nmol/L 13.8 (L)   Testosterone, Total Latest Ref Range: 264 - 916 ng/dL 979 (H)   Testosterone, Free Latest Ref Range: 6.8 - 21.5 pg/mL 21.4   Hemoglobin Latest Ref Range: 13.0 - 17.7 g/dL 17.0   Hematocrit Latest Ref Range: 37.5 - 51.0 % 52.6 (H)         Assessment  Mr. Luu is a 46 y.o. male who presents with LUTS, primarily weak stream, urinary frequency, nocturia, as well as low libido and low energy, low testosterone.  He is status post DVIU with great resolution of his urinary symptoms.  He is currently on testosterone cypionate weekly self injections.  He presents today with elevated estradiol and hematocrit levels, libido has decreased somewhat, though energy is good.     Plan  1.  Continue weekly testosterone cypionate injections, decrease dosage to 100mg  2.  Add anastrozole weekly  3.  Phlebotomy, likely 3 times a year  4.  FU in 6 mo w/ labs -testosterone, hematocrit, estradiol              Julito Chow MD

## 2021-09-08 DIAGNOSIS — N52.9 ERECTILE DYSFUNCTION, UNSPECIFIED ERECTILE DYSFUNCTION TYPE: ICD-10-CM

## 2021-09-25 DIAGNOSIS — N52.9 ERECTILE DYSFUNCTION, UNSPECIFIED ERECTILE DYSFUNCTION TYPE: ICD-10-CM

## 2021-10-18 DIAGNOSIS — F43.0 STRESS REACTION CAUSING MIXED DISTURBANCE OF EMOTION AND CONDUCT: Primary | ICD-10-CM

## 2021-10-18 PROBLEM — D75.1 POLYCYTHEMIA: Status: ACTIVE | Noted: 2021-10-18

## 2021-10-18 RX ORDER — SODIUM CHLORIDE 9 MG/ML
250 INJECTION, SOLUTION INTRAVENOUS ONCE
OUTPATIENT
Start: 2021-10-18

## 2021-10-18 RX ORDER — FLUOXETINE HYDROCHLORIDE 20 MG/1
20 CAPSULE ORAL DAILY
Qty: 90 CAPSULE | Refills: 0 | Status: SHIPPED | OUTPATIENT
Start: 2021-10-18 | End: 2021-11-02 | Stop reason: SDUPTHER

## 2021-10-19 ENCOUNTER — TELEPHONE (OUTPATIENT)
Dept: INTERNAL MEDICINE | Facility: CLINIC | Age: 46
End: 2021-10-19

## 2021-10-19 NOTE — TELEPHONE ENCOUNTER
Patient did not complete MRI BRAIN AND US CAROTID ordered on 09/24/2020. This order is too old to be used. May I cancel the order?

## 2021-11-02 ENCOUNTER — OFFICE VISIT (OUTPATIENT)
Dept: INTERNAL MEDICINE | Facility: CLINIC | Age: 46
End: 2021-11-02

## 2021-11-02 VITALS
HEART RATE: 70 BPM | WEIGHT: 294 LBS | OXYGEN SATURATION: 99 % | BODY MASS INDEX: 36.56 KG/M2 | TEMPERATURE: 97.9 F | SYSTOLIC BLOOD PRESSURE: 136 MMHG | HEIGHT: 75 IN | DIASTOLIC BLOOD PRESSURE: 82 MMHG

## 2021-11-02 DIAGNOSIS — Z00.00 PREVENTATIVE HEALTH CARE: ICD-10-CM

## 2021-11-02 DIAGNOSIS — R53.82 CHRONIC FATIGUE: ICD-10-CM

## 2021-11-02 DIAGNOSIS — D75.1 POLYCYTHEMIA: ICD-10-CM

## 2021-11-02 DIAGNOSIS — Z00.00 ANNUAL PHYSICAL EXAM: Primary | ICD-10-CM

## 2021-11-02 DIAGNOSIS — L30.1 DYSHIDROTIC ECZEMA: ICD-10-CM

## 2021-11-02 DIAGNOSIS — Z11.59 NEED FOR HEPATITIS C SCREENING TEST: ICD-10-CM

## 2021-11-02 DIAGNOSIS — E78.2 MODERATE MIXED HYPERLIPIDEMIA NOT REQUIRING STATIN THERAPY: ICD-10-CM

## 2021-11-02 DIAGNOSIS — E55.9 VITAMIN D DEFICIENCY: ICD-10-CM

## 2021-11-02 DIAGNOSIS — F43.0 STRESS REACTION CAUSING MIXED DISTURBANCE OF EMOTION AND CONDUCT: ICD-10-CM

## 2021-11-02 DIAGNOSIS — E66.09 CLASS 2 OBESITY DUE TO EXCESS CALORIES WITHOUT SERIOUS COMORBIDITY WITH BODY MASS INDEX (BMI) OF 36.0 TO 36.9 IN ADULT: ICD-10-CM

## 2021-11-02 DIAGNOSIS — R73.01 IMPAIRED FASTING GLUCOSE: ICD-10-CM

## 2021-11-02 PROCEDURE — 99396 PREV VISIT EST AGE 40-64: CPT | Performed by: PHYSICIAN ASSISTANT

## 2021-11-02 RX ORDER — FLUOXETINE HYDROCHLORIDE 20 MG/1
20 CAPSULE ORAL DAILY
Qty: 90 CAPSULE | Refills: 1 | Status: SHIPPED | OUTPATIENT
Start: 2021-11-02 | End: 2022-06-16 | Stop reason: SINTOL

## 2021-11-02 RX ORDER — MULTIPLE VITAMINS W/ MINERALS TAB 9MG-400MCG
1 TAB ORAL DAILY
COMMUNITY

## 2021-11-02 NOTE — PROGRESS NOTES
Male Physical Note      Patient Name: Cooper Luu  : 1975   MRN: [id number]     Chief Complaint:    Chief Complaint   Patient presents with   • Annual Exam       History of Present Illness: Cooper Luu is a 46 y.o. male who is here today for their annual health maintenance and physical.     He is still smoking but plans to quit 12/10/21.     He has been taking a lot of vitamins and would like to check levels today. He is currently taking Vitamin C, Vitamin D 10,000 units daily, Zinc, a probiotic and a multivitamin.     Prozac has been helpful with anxiety.         Subjective         Past Medical History, Social History, Family History and Care Team were all reviewed with patient and updated as appropriate.       Current Outpatient Medications:   •  FLUoxetine (PROzac) 20 MG capsule, Take 1 capsule by mouth Daily., Disp: 90 capsule, Rfl: 1  •  multivitamin with minerals (MULTIVITAMIN ADULT PO), Take 1 tablet by mouth Daily., Disp: , Rfl:   •  saccharomyces boulardii (FLORASTOR) 250 MG capsule, Take 250 mg by mouth Daily. Probiotic once daily, Disp: , Rfl:   •  tadalafil (CIALIS) 5 MG tablet, Take 1 tablet by mouth Daily., Disp: 90 tablet, Rfl: 3  •  testosterone cypionate (DEPO-TESTOSTERONE) 100 MG/ML solution injection, Inject 1 mL into the appropriate muscle as directed by prescriber 1 (One) Time Per Week., Disp: 10 mL, Rfl: 1  •  vitamin C (ASCORBIC ACID) 250 MG tablet, Take 1,000 mg by mouth Daily., Disp: , Rfl:   •  VITAMIN D, CHOLECALCIFEROL, PO, Take 2,000 Units by mouth Daily., Disp: , Rfl:   •  Zinc 50 MG capsule, Take 50 mg by mouth Daily., Disp: , Rfl:   •  triamcinolone (KENALOG) 0.1 % ointment, Apply a thin layer to effected areas twice daily during eczema flare ups.  Use for not more than 10 days at a time. Avoid face and groin., Disp: 80 g, Rfl: 2    Allergies   Allergen Reactions   • Hydrocodone Hives, Itching, Nausea Only and Rash   • Sulfa Antibiotics Itching and Rash          Diet/Physical activity: exercises 3 days per week, intermittent fasting    Sexual health: active      The 10-year ASCVD risk score (Juan SOHAM Jr., et al., 2013) is: 11.9%    Values used to calculate the score:      Age: 46 years      Sex: Male      Is Non- : No      Diabetic: No      Tobacco smoker: Yes      Systolic Blood Pressure: 136 mmHg      Is BP treated: No      HDL Cholesterol: 27 mg/dL      Total Cholesterol: 199 mg/dL    Health Maintenance:   Health Maintenance Summary          Ordered - HEPATITIS C SCREENING (Once) Ordered on 11/2/2021    No completion, postpone, or frequency change history exists for this topic.          Overdue - ANNUAL PHYSICAL (Yearly) Overdue since 12/4/2020 12/03/2019  Registry Metric: Last Annual Physical          Postponed - INFLUENZA VACCINE (Yearly - August to March) Postponed until 11/2/2022 11/02/2021  Postponed until 11/2/2022 by Samantha Govea MA (Patient Refused)    02/12/2021  Imm Admin: Flulaval/Fluarix/Fluzone Quad    11/20/2019  Imm Admin: flucelvax quad pfs =>4 YRS    11/20/2019  Imm Admin: Flu Vaccine Intradermal Quad 18-64YR    12/11/2018  Imm Admin: flucelvax quad pfs =>4 YRS    Only the first 5 history entries have been loaded, but more history exists.          COVID-19 Vaccine (2 - Moderna 2-dose series) Next due on 11/12/2021    10/15/2021  Imm Admin: COVID-19 (MODERNA)          Ordered - LIPID PANEL (Yearly) Ordered on 11/2/2021 12/18/2020  Lipid Panel    10/13/2017  Lipid Panel          TDAP/TD VACCINES (2 - Td or Tdap) Next due on 12/3/2029    12/03/2019  Imm Admin: Tdap          Pneumococcal Vaccine 0-64 (2 of 2 - PPSV23) Next due on 1/11/2042 12/03/2019  Imm Admin: Pneumococcal Polysaccharide (PPSV23)               PSA(Over age 50):   Lab Results   Component Value Date    PSA 1.240 12/18/2020    PSA 0.497 08/27/2019    PSA 0.500 05/24/2018        Osteoporosis screening:   • Men: history of low trauma fracture,  "androgen deprivation therapy for prostate cancer, hypogonadism, primary hyperparathyroidism, intestinal disorders.       Objective     Physical Exam:  Vital Signs:   Vitals:    11/02/21 0943   BP: 136/82   Pulse: 70   Temp: 97.9 °F (36.6 °C)   SpO2: 99%   Weight: 133 kg (294 lb)   Height: 190.5 cm (75\")     Body mass index is 36.75 kg/m².     Physical Exam  Vitals and nursing note reviewed.   Constitutional:       General: He is not in acute distress.     Appearance: Normal appearance. He is well-developed. He is obese. He is not ill-appearing, toxic-appearing or diaphoretic.   HENT:      Head: Normocephalic and atraumatic.      Right Ear: Tympanic membrane, ear canal and external ear normal. There is no impacted cerumen.      Left Ear: Tympanic membrane, ear canal and external ear normal. There is no impacted cerumen.   Eyes:      General: No scleral icterus.        Right eye: No discharge.         Left eye: No discharge.      Extraocular Movements: Extraocular movements intact.      Conjunctiva/sclera: Conjunctivae normal.      Pupils: Pupils are equal, round, and reactive to light.   Neck:      Thyroid: No thyromegaly.   Cardiovascular:      Rate and Rhythm: Normal rate and regular rhythm.      Heart sounds: Normal heart sounds. No murmur heard.  No friction rub. No gallop.    Pulmonary:      Effort: Pulmonary effort is normal. No respiratory distress.      Breath sounds: Normal breath sounds. No wheezing, rhonchi or rales.   Chest:      Chest wall: No tenderness.   Abdominal:      General: Bowel sounds are normal. There is no distension.      Palpations: Abdomen is soft. There is no mass.      Tenderness: There is no abdominal tenderness. There is no right CVA tenderness, left CVA tenderness, guarding or rebound.      Hernia: No hernia is present.   Musculoskeletal:         General: No tenderness or signs of injury. Normal range of motion.      Cervical back: Normal range of motion and neck supple. No rigidity " or tenderness.      Right lower leg: No edema.      Left lower leg: No edema.   Lymphadenopathy:      Cervical: No cervical adenopathy.   Skin:     General: Skin is warm and dry.      Capillary Refill: Capillary refill takes less than 2 seconds.      Coloration: Skin is not pale.      Findings: Rash (eczema bilateral hands) present. No erythema.   Neurological:      General: No focal deficit present.      Mental Status: He is alert and oriented to person, place, and time.      Cranial Nerves: No cranial nerve deficit.      Sensory: No sensory deficit.      Coordination: Coordination normal.      Gait: Gait normal.      Deep Tendon Reflexes: Reflexes normal.   Psychiatric:         Mood and Affect: Mood normal.         Behavior: Behavior normal.         Thought Content: Thought content normal.         Judgment: Judgment normal.         Procedures    Assessment / Plan      Assessment/Plan:   Diagnoses and all orders for this visit:    1. Annual physical exam (Primary)    2. Class 2 obesity due to excess calories without serious comorbidity with body mass index (BMI) of 36.0 to 36.9 in adult  Patient's (Body mass index is 36.75 kg/m².) indicates that they are obese (BMI >30) with health related conditions that include dyslipidemias . Weight is improving with lifestyle modifications. BMI is is above average; BMI management plan is completed. We discussed low calorie, low carb based diet program, portion control and increasing exercise.     3. Polycythemia  -     Ferritin  -     CBC & Differential  Secondary to testosterone use.     4. Vitamin D deficiency  -     Vitamin D 25 Hydroxy  Taking 10,000 units daily.     5. Preventative health care  -     Comprehensive Metabolic Panel    6. Moderate mixed hyperlipidemia not requiring statin therapy  -     Lipid Panel    7. Chronic fatigue  -     Vitamin B12    8. Need for hepatitis C screening test  -     Hepatitis C Antibody    9. Impaired fasting glucose  -     Hemoglobin  A1c    10. 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  Dispense: 90 capsule; Refill: 1    11. Dyshidrotic eczema  -     triamcinolone (KENALOG) 0.1 % ointment; Apply a thin layer to effected areas twice daily during eczema flare ups.  Use for not more than 10 days at a time. Avoid face and groin.  Dispense: 80 g; Refill: 2         Follow Up:   Return in about 1 year (around 11/2/2022) for Annual physical.    Healthcare Maintenance:   Patient Counseling:  --Nutrition: Stressed importance of moderation in sodium/caffeine intake, saturated fat and cholesterol, caloric balance, sufficient intake of fresh fruits, vegetables, fiber, calcium and iron.  --Discussed the issue of calcium supplement, and the daily use of baby aspirin if applicable.  --Exercise: Stressed the importance of regular exercise.   --Substance Abuse: Discussed cessation/primary prevention of tobacco (if applicable, alcohol, or other drug use (if applicable); driving or other dangerous activities under the influence; availability of treatment for abuse.    --Sexuality: Discussed sexually transmitted diseases, partner selection, use of condoms, avoidance of unintended pregnancy  and contraceptive alternatives.   --Injury prevention: Discussed safety belts, safety helmets, smoke detector, smoking near bedding or upholstery.   --Dental health: Discussed importance of regular tooth brushing, flossing, and dental visits.  --Immunizations reviewed.  --Discussed benefits of screening colonoscopy (if applicable).  --After hours service discussed with patient.    Shaina Fields PA-C  [facility]     Please note that portions of this note may have been completed with a voice recognition program. Efforts were made to edit the dictations, but occasionally words are mistranscribed.

## 2021-11-03 LAB
25(OH)D3+25(OH)D2 SERPL-MCNC: 84.5 NG/ML (ref 30–100)
ALBUMIN SERPL-MCNC: 4.7 G/DL (ref 3.5–5.2)
ALBUMIN/GLOB SERPL: 1.8 G/DL
ALP SERPL-CCNC: 81 U/L (ref 39–117)
ALT SERPL-CCNC: 28 U/L (ref 1–41)
AST SERPL-CCNC: 27 U/L (ref 1–40)
BASOPHILS # BLD AUTO: 0.07 10*3/MM3 (ref 0–0.2)
BASOPHILS NFR BLD AUTO: 0.7 % (ref 0–1.5)
BILIRUB SERPL-MCNC: 0.7 MG/DL (ref 0–1.2)
BUN SERPL-MCNC: 16 MG/DL (ref 6–20)
BUN/CREAT SERPL: 16.5 (ref 7–25)
CALCIUM SERPL-MCNC: 9.8 MG/DL (ref 8.6–10.5)
CHLORIDE SERPL-SCNC: 100 MMOL/L (ref 98–107)
CHOLEST SERPL-MCNC: 170 MG/DL (ref 0–200)
CO2 SERPL-SCNC: 25.9 MMOL/L (ref 22–29)
CREAT SERPL-MCNC: 0.97 MG/DL (ref 0.76–1.27)
EOSINOPHIL # BLD AUTO: 0.34 10*3/MM3 (ref 0–0.4)
EOSINOPHIL NFR BLD AUTO: 3.5 % (ref 0.3–6.2)
ERYTHROCYTE [DISTWIDTH] IN BLOOD BY AUTOMATED COUNT: 12.5 % (ref 12.3–15.4)
FERRITIN SERPL-MCNC: 234 NG/ML (ref 30–400)
GLOBULIN SER CALC-MCNC: 2.6 GM/DL
GLUCOSE SERPL-MCNC: 89 MG/DL (ref 65–99)
HBA1C MFR BLD: 5.38 % (ref 4.8–5.6)
HCT VFR BLD AUTO: 49.9 % (ref 37.5–51)
HCV AB S/CO SERPL IA: <0.1 S/CO RATIO (ref 0–0.9)
HDLC SERPL-MCNC: 38 MG/DL (ref 40–60)
HGB BLD-MCNC: 17.2 G/DL (ref 13–17.7)
IMM GRANULOCYTES # BLD AUTO: 0.03 10*3/MM3 (ref 0–0.05)
IMM GRANULOCYTES NFR BLD AUTO: 0.3 % (ref 0–0.5)
LDLC SERPL CALC-MCNC: 117 MG/DL (ref 0–100)
LYMPHOCYTES # BLD AUTO: 2.21 10*3/MM3 (ref 0.7–3.1)
LYMPHOCYTES NFR BLD AUTO: 22.8 % (ref 19.6–45.3)
MCH RBC QN AUTO: 30.9 PG (ref 26.6–33)
MCHC RBC AUTO-ENTMCNC: 34.5 G/DL (ref 31.5–35.7)
MCV RBC AUTO: 89.6 FL (ref 79–97)
MONOCYTES # BLD AUTO: 0.62 10*3/MM3 (ref 0.1–0.9)
MONOCYTES NFR BLD AUTO: 6.4 % (ref 5–12)
NEUTROPHILS # BLD AUTO: 6.43 10*3/MM3 (ref 1.7–7)
NEUTROPHILS NFR BLD AUTO: 66.3 % (ref 42.7–76)
NRBC BLD AUTO-RTO: 0 /100 WBC (ref 0–0.2)
PLATELET # BLD AUTO: 263 10*3/MM3 (ref 140–450)
POTASSIUM SERPL-SCNC: 4.5 MMOL/L (ref 3.5–5.2)
PROT SERPL-MCNC: 7.3 G/DL (ref 6–8.5)
RBC # BLD AUTO: 5.57 10*6/MM3 (ref 4.14–5.8)
SODIUM SERPL-SCNC: 136 MMOL/L (ref 136–145)
TRIGL SERPL-MCNC: 81 MG/DL (ref 0–150)
VIT B12 SERPL-MCNC: 543 PG/ML (ref 211–946)
VLDLC SERPL CALC-MCNC: 15 MG/DL (ref 5–40)
WBC # BLD AUTO: 9.7 10*3/MM3 (ref 3.4–10.8)

## 2021-11-03 RX ORDER — TADALAFIL 5 MG/1
5 TABLET ORAL DAILY
Qty: 90 TABLET | Refills: 3 | Status: SHIPPED | OUTPATIENT
Start: 2021-11-03

## 2021-11-03 RX ORDER — TADALAFIL 5 MG/1
5 TABLET ORAL DAILY
Qty: 90 TABLET | Refills: 3 | Status: SHIPPED | OUTPATIENT
Start: 2021-11-03 | End: 2022-06-16 | Stop reason: SDUPTHER

## 2021-12-05 ENCOUNTER — PATIENT MESSAGE (OUTPATIENT)
Dept: UROLOGY | Facility: CLINIC | Age: 46
End: 2021-12-05

## 2021-12-05 DIAGNOSIS — R79.89 LOW TESTOSTERONE: ICD-10-CM

## 2021-12-27 DIAGNOSIS — R79.89 LOW TESTOSTERONE: ICD-10-CM

## 2021-12-27 RX ORDER — TESTOSTERONE CYPIONATE 100 MG/ML
100 INJECTION, SOLUTION INTRAMUSCULAR WEEKLY
Qty: 10 ML | Refills: 1 | Status: SHIPPED | OUTPATIENT
Start: 2021-12-27 | End: 2022-06-16

## 2021-12-27 RX ORDER — TESTOSTERONE CYPIONATE 100 MG/ML
100 INJECTION, SOLUTION INTRAMUSCULAR WEEKLY
Qty: 10 ML | Refills: 1 | Status: SHIPPED | OUTPATIENT
Start: 2021-12-27 | End: 2021-12-27

## 2021-12-27 RX ORDER — BLOOD PRESSURE TEST KIT
KIT MISCELLANEOUS
Qty: 100 EACH | Refills: 11 | Status: SHIPPED | OUTPATIENT
Start: 2021-12-27 | End: 2022-06-16

## 2021-12-27 RX ORDER — BLOOD PRESSURE TEST KIT
KIT MISCELLANEOUS
Qty: 100 EACH | Refills: 11 | Status: SHIPPED | OUTPATIENT
Start: 2021-12-27 | End: 2021-12-27

## 2022-06-16 ENCOUNTER — OFFICE VISIT (OUTPATIENT)
Dept: INTERNAL MEDICINE | Facility: CLINIC | Age: 47
End: 2022-06-16

## 2022-06-16 ENCOUNTER — HOSPITAL ENCOUNTER (OUTPATIENT)
Dept: GENERAL RADIOLOGY | Facility: HOSPITAL | Age: 47
Discharge: HOME | End: 2022-06-16
Admitting: NURSE PRACTITIONER

## 2022-06-16 VITALS
HEART RATE: 60 BPM | RESPIRATION RATE: 16 BRPM | SYSTOLIC BLOOD PRESSURE: 158 MMHG | HEIGHT: 76 IN | OXYGEN SATURATION: 99 % | DIASTOLIC BLOOD PRESSURE: 90 MMHG | BODY MASS INDEX: 37.24 KG/M2 | WEIGHT: 305.8 LBS | TEMPERATURE: 97.7 F

## 2022-06-16 DIAGNOSIS — R03.0 ELEVATED BLOOD PRESSURE READING IN OFFICE WITHOUT DIAGNOSIS OF HYPERTENSION: ICD-10-CM

## 2022-06-16 DIAGNOSIS — F41.8 DEPRESSION WITH ANXIETY: ICD-10-CM

## 2022-06-16 DIAGNOSIS — Z12.11 SCREENING FOR COLON CANCER: ICD-10-CM

## 2022-06-16 DIAGNOSIS — M65.331 TRIGGER FINGER, RIGHT MIDDLE FINGER: Primary | ICD-10-CM

## 2022-06-16 DIAGNOSIS — M25.441 FINGER JOINT SWELLING, RIGHT: ICD-10-CM

## 2022-06-16 DIAGNOSIS — R21 RASH OF BOTH HANDS: ICD-10-CM

## 2022-06-16 PROCEDURE — 99214 OFFICE O/P EST MOD 30 MIN: CPT | Performed by: NURSE PRACTITIONER

## 2022-06-16 PROCEDURE — 73130 X-RAY EXAM OF HAND: CPT

## 2022-06-16 RX ORDER — BUPROPION HYDROCHLORIDE 150 MG/1
150 TABLET ORAL DAILY
Qty: 30 TABLET | Refills: 0 | Status: SHIPPED | OUTPATIENT
Start: 2022-06-16 | End: 2022-07-07 | Stop reason: SDUPTHER

## 2022-06-16 RX ORDER — PREDNISONE 20 MG/1
40 TABLET ORAL DAILY
Qty: 10 TABLET | Refills: 0 | Status: SHIPPED | OUTPATIENT
Start: 2022-06-16 | End: 2022-06-21

## 2022-06-16 NOTE — PROGRESS NOTES
No signs of dislocation, fracture or arthritis at this time. Normal xray. Splint for at least 1 week, ice, take NSAIDs prn and I sent some steroids for inflammation. More than likely trigger finger. Will call with other labs in a few days.

## 2022-06-16 NOTE — PROGRESS NOTES
Chief Complaint   Patient presents with   • Joint Swelling     R middle finger swelling, intermittent X 2 months, limited ROM; discuss anxiety meds     Subjective   Cooper Luu is a 47 y.o. male.     History of Present Illness     Right middle finger joint swelling 2 months on and off. Nature: ache. Alleviating factors: oral steroids in the past during flares. Aggravating factors: movement. Pain does not radiate.  Denies injury to site.  Steroids have helped in the past. States he has limited range of motion and feels like his finger is stuck and unable to move at times.  Has not tried anything for the symptoms at this time.  He is an avid weightlifter and has history of multiple joint pains because of duty in the [employer] but denies other joints becoming inflamed like this.       He has a rash on bilateral hands that appears to be psoriasis.  Patient states he has been diagnosed with eczema and uses a Kenalog ointment as needed but it flares on and off throughout the years.  He sees dermatology. Rash is on knuckles and in between fingers. Itches on and off. Kenalog ointment helps.     His blood pressure is elevated today, recheck was better but still elevated.  He is not taking anything for hypertension.  He has never been treated for hypertension or had issues before.    He is interested in starting Wellbutrin for depression/anxiety which is related to going through a divorce recently. PHQ-9: Brief Depression Severity Measure Score: 13 and KEITH 7 Total Score: 10.       The following portions of the patient's history were reviewed and updated as appropriate: allergies, current medications, past family history, past medical history, past social history, past surgical history and problem list.    Review of Systems   Constitutional: Negative.    Respiratory: Negative.    Cardiovascular: Negative.    Gastrointestinal: Negative.    Musculoskeletal: Positive for arthralgias and joint swelling. Negative for myalgias.  "  Skin: Positive for rash.   Neurological: Negative.    Psychiatric/Behavioral: Positive for dysphoric mood. Negative for sleep disturbance. The patient is nervous/anxious.    All other systems reviewed and are negative.      Objective   /90   Pulse 60   Temp 97.7 °F (36.5 °C) (Temporal)   Resp 16   Ht 193 cm (76\")   Wt (!) 139 kg (305 lb 12.8 oz)   SpO2 99%   BMI 37.22 kg/m²   Body mass index is 37.22 kg/m².  Physical Exam  Constitutional:       Appearance: Normal appearance.   HENT:      Head: Normocephalic and atraumatic.   Cardiovascular:      Rate and Rhythm: Normal rate and regular rhythm.   Pulmonary:      Effort: Pulmonary effort is normal.      Breath sounds: Normal breath sounds.   Musculoskeletal:      Right hand: Swelling and tenderness present. Normal range of motion. Normal strength. Normal sensation.      Comments: Right middle finger with edema and tenderness at base, full ROM but mild pain with ROM, dry scaly rash around knuckles and in between fingers of both hands    Neurological:      Mental Status: He is alert and oriented to person, place, and time.   Psychiatric:         Mood and Affect: Mood normal.         Behavior: Behavior normal.         Thought Content: Thought content normal.         Judgment: Judgment normal.         Assessment & Plan   Cooper Luu is here today and the following problems have been addressed:      Diagnoses and all orders for this visit:    1. Trigger finger, right middle finger (Primary)  -     Finger splint placed. Recommend NSAIDs, ice, rest. Wear split for at least 7 days.  -    predniSONE (DELTASONE) 20 MG tablet; Take 2 tablets by mouth Daily for 5 days    2. Finger joint swelling, right  -     XR Hand 3+ View Right  - Differential diagnoses discussed; appears to be trigger finger. Patient had had ongoing swelling of this fingers for over 2 months and has psoriasis type rash on bilateral hands. Would like to obtain basic xray of hand to look " for arthritis and lab panel. Psoriatic arthritis is a differential based on where the rash is and the symptoms, but more unlikely since it is just 1 finger affected.   -     CBC No Differential  -     Comprehensive Metabolic Panel  -     Sedimentation rate, automated  -     C-reactive protein  -     Rheumatoid Factor  -     Cyclic Citrul Peptide Antibody, IgG / IgA  -     KAROLINE With / DsDNA, RNP, Sjogrens A / B, Smith  -     [id number] Antigen  -     predniSONE (DELTASONE) 20 MG tablet; Take 2 tablets by mouth Daily for 5 days.  Dispense: 10 tablet; Refill: 0    3. Rash of both hands  -     Continue kenalog ointment prn for rash. Patient follows dermatology and was diagnosed with eczema.     4. Depression with anxiety  -     buPROPion XL (Wellbutrin XL) 150 MG 24 hr tablet; Take 1 tablet by mouth Daily.  Dispense: 30 tablet; Refill: 0  - PHQ and KEITH are indicative for depression and anxiety. Patient would like to try a trial of wellbutrin. Wellbutrin sent to pharmacy at 150 mg dosage. Instructed patient side effects, and possible worsening of his anxiety. He is to contact me if this worsens his symptoms and we will need to try another medication. Patient verbalizes understanding.     5. Screening for colon cancer  -     Cologuard - Stool, Per Rectum; Future    6. Elevated blood pressure reading without diagnosis of hypertension  -     BP recheck was improved, but still elevated. Recommend DASH diet, moderate-intensity exercises 4-5 times/week, and home blood pressure monitoring. Bring BP log into your follow up appointment.         Follow Up   Return in about 2 weeks (around 6/30/2022) for Recheck.  Patient was given instructions and counseling regarding his condition or for health maintenance advice. Please see specific information pulled into the AVS if appropriate.     Juliette KING  [facility] [place]

## 2022-06-23 LAB
ALBUMIN SERPL-MCNC: 4.3 G/DL (ref 3.5–5.2)
ALBUMIN/GLOB SERPL: 1.3 G/DL
ALP SERPL-CCNC: 72 U/L (ref 39–117)
ALT SERPL-CCNC: 22 U/L (ref 1–41)
ANA SER QL: NEGATIVE
AST SERPL-CCNC: 20 U/L (ref 1–40)
BILIRUB SERPL-MCNC: 0.6 MG/DL (ref 0–1.2)
BUN SERPL-MCNC: 9 MG/DL (ref 6–20)
BUN/CREAT SERPL: 8.7 (ref 7–25)
CALCIUM SERPL-MCNC: 9.7 MG/DL (ref 8.6–10.5)
CCP IGA+IGG SERPL IA-ACNC: 4 UNITS (ref 0–19)
CHLORIDE SERPL-SCNC: 101 MMOL/L (ref 98–107)
CO2 SERPL-SCNC: 26.4 MMOL/L (ref 22–29)
CREAT SERPL-MCNC: 1.04 MG/DL (ref 0.76–1.27)
CRP SERPL-MCNC: <0.3 MG/DL (ref 0–0.5)
EGFRCR SERPLBLD CKD-EPI 2021: 90.2 ML/MIN/1.73
ERYTHROCYTE [DISTWIDTH] IN BLOOD BY AUTOMATED COUNT: 12.1 % (ref 12.3–15.4)
ERYTHROCYTE [SEDIMENTATION RATE] IN BLOOD BY WESTERGREN METHOD: 8 MM/HR (ref 0–15)
GLOBULIN SER CALC-MCNC: 3.3 GM/DL
GLUCOSE SERPL-MCNC: 80 MG/DL (ref 65–99)
HCT VFR BLD AUTO: 41 % (ref 37.5–51)
HGB BLD-MCNC: 14.1 G/DL (ref 13–17.7)
HLA-B27 QL NAA+PROBE: NEGATIVE
MCH RBC QN AUTO: 31.4 PG (ref 26.6–33)
MCHC RBC AUTO-ENTMCNC: 34.4 G/DL (ref 31.5–35.7)
MCV RBC AUTO: 91.3 FL (ref 79–97)
PLATELET # BLD AUTO: 237 10*3/MM3 (ref 140–450)
POTASSIUM SERPL-SCNC: 4.4 MMOL/L (ref 3.5–5.2)
PROT SERPL-MCNC: 7.6 G/DL (ref 6–8.5)
RBC # BLD AUTO: 4.49 10*6/MM3 (ref 4.14–5.8)
RHEUMATOID FACT SERPL-ACNC: <10 IU/ML
SODIUM SERPL-SCNC: 138 MMOL/L (ref 136–145)
WBC # BLD AUTO: 7.45 10*3/MM3 (ref 3.4–10.8)

## 2022-06-30 ENCOUNTER — OFFICE VISIT (OUTPATIENT)
Dept: INTERNAL MEDICINE | Facility: CLINIC | Age: 47
End: 2022-06-30

## 2022-06-30 VITALS
SYSTOLIC BLOOD PRESSURE: 130 MMHG | DIASTOLIC BLOOD PRESSURE: 90 MMHG | OXYGEN SATURATION: 99 % | RESPIRATION RATE: 16 BRPM | BODY MASS INDEX: 37.58 KG/M2 | HEART RATE: 59 BPM | HEIGHT: 75 IN | WEIGHT: 302.2 LBS | TEMPERATURE: 97.3 F

## 2022-06-30 DIAGNOSIS — F41.8 DEPRESSION WITH ANXIETY: ICD-10-CM

## 2022-06-30 DIAGNOSIS — M65.331 TRIGGER FINGER, RIGHT MIDDLE FINGER: Primary | ICD-10-CM

## 2022-06-30 PROCEDURE — 99214 OFFICE O/P EST MOD 30 MIN: CPT | Performed by: NURSE PRACTITIONER

## 2022-06-30 RX ORDER — ANASTROZOLE 1 MG/1
1 TABLET ORAL WEEKLY
COMMUNITY
Start: 2022-05-02 | End: 2022-09-26

## 2022-06-30 RX ORDER — ZINC GLUCONATE 50 MG
1 TABLET ORAL DAILY
COMMUNITY

## 2022-06-30 RX ORDER — TESTOSTERONE 20.25 MG/1.25G
GEL TOPICAL
COMMUNITY
Start: 2022-06-15 | End: 2022-09-26 | Stop reason: ALTCHOICE

## 2022-06-30 NOTE — PROGRESS NOTES
"Chief Complaint   Patient presents with   • Follow-up     Trigger finger, sx resolved       Subjective   Cooper Luu is a 47 y.o. male.     History of Present Illness     Presents for follow-up post trigger finger of right middle finger and depression/anxiety.  Completed the course of steroids and wore the finger splint.  Patient states symptoms completely resolved with finger.  Depression and anxiety are still uncontrolled.  He feels as though the Wellbutrin is not helping at this time, but he has not been on it but for a couple weeks.  States he is having issues sleeping and wondering if it has to do with the medication.    The following portions of the patient's history were reviewed and updated as appropriate: allergies, current medications, past family history, past medical history, past social history, past surgical history and problem list.    Review of Systems   Musculoskeletal: Negative for arthralgias, joint swelling and myalgias.   Psychiatric/Behavioral: Positive for dysphoric mood and sleep disturbance. Negative for agitation, behavioral problems, decreased concentration, self-injury and suicidal ideas. The patient is nervous/anxious. The patient is not hyperactive.    All other systems reviewed and are negative.      Objective   /90   Pulse 59   Temp 97.3 °F (36.3 °C) (Temporal)   Resp 16   Ht 190.5 cm (75\")   Wt (!) 137 kg (302 lb 3.2 oz)   SpO2 99%   BMI 37.77 kg/m²   Body mass index is 37.77 kg/m².  Physical Exam  Vitals and nursing note reviewed.   Constitutional:       Appearance: Normal appearance.   HENT:      Head: Normocephalic and atraumatic.   Cardiovascular:      Rate and Rhythm: Normal rate and regular rhythm.   Pulmonary:      Effort: Pulmonary effort is normal.      Breath sounds: Normal breath sounds.   Musculoskeletal:         General: No swelling, tenderness or deformity. Normal range of motion.   Neurological:      Mental Status: He is alert and oriented to " person, place, and time.   Psychiatric:         Mood and Affect: Mood normal.         Behavior: Behavior normal.         Thought Content: Thought content normal.         Judgment: Judgment normal.         Assessment & Plan   Cooper Luu is here today and the following problems have been addressed:      Diagnoses and all orders for this visit:    1. Trigger finger, right middle finger (Primary)  - Resolved     2. Depression with anxiety  - Uncontrolled. Patient states he will try the medication for a couple more weeks to see if he notices benefit. He denies wanting to try an SSRI medication as it causes sexual side effects. We can discuss other options at next appointment if still not noticing benefit. Pt agrees with this plan. Recommended patient take melatonin 1 hour before bed to see if this helps him relax at night. The Wellbutrin could be causing this side effect. He takes it at 5 am, not at night.       Follow Up   Return in about 2 weeks (around 7/14/2022) for anxiety/depression.  Patient was given instructions and counseling regarding his condition or for health maintenance advice. Please see specific information pulled into the AVS if appropriate.     Juliette KING  [facility]

## 2022-07-07 ENCOUNTER — PATIENT MESSAGE (OUTPATIENT)
Dept: INTERNAL MEDICINE | Facility: CLINIC | Age: 47
End: 2022-07-07

## 2022-07-07 DIAGNOSIS — F41.8 DEPRESSION WITH ANXIETY: ICD-10-CM

## 2022-07-07 RX ORDER — BUPROPION HYDROCHLORIDE 150 MG/1
150 TABLET ORAL DAILY
Qty: 30 TABLET | Refills: 0 | Status: SHIPPED | OUTPATIENT
Start: 2022-07-07 | End: 2022-07-11 | Stop reason: SDUPTHER

## 2022-07-07 NOTE — TELEPHONE ENCOUNTER
From: Cooper Luu  To: CHRISTINE Madrid  Sent: 7/7/2022 5:23 AM EDT  Subject: Not enough Bupropion XL to last until visit    Good morning, I will run out of the Wellbutrin XL you prescribed a couple of days before my visit on the 15th. I know with this type of medicine missing days can impact results, so I was hoping I could get a refill before the visit on the 15th. Thank you

## 2022-07-11 DIAGNOSIS — F41.8 DEPRESSION WITH ANXIETY: ICD-10-CM

## 2022-07-11 RX ORDER — BUPROPION HYDROCHLORIDE 150 MG/1
150 TABLET ORAL DAILY
Qty: 30 TABLET | Refills: 0 | Status: SHIPPED | OUTPATIENT
Start: 2022-07-11 | End: 2022-09-14 | Stop reason: SDUPTHER

## 2022-09-14 DIAGNOSIS — F41.8 DEPRESSION WITH ANXIETY: ICD-10-CM

## 2022-09-14 NOTE — TELEPHONE ENCOUNTER
Rx Refill Note  Requested Prescriptions     Pending Prescriptions Disp Refills   • buPROPion XL (Wellbutrin XL) 150 MG 24 hr tablet 30 tablet 0     Sig: Take 1 tablet by mouth Daily.      Last office visit with prescribing clinician: 6/30/2022      Next office visit with prescribing clinician: 9/26/2022            Krista Mtz MA  09/14/22, 10:29 EDT

## 2022-09-15 RX ORDER — BUPROPION HYDROCHLORIDE 150 MG/1
150 TABLET ORAL DAILY
Qty: 30 TABLET | Refills: 0 | Status: SHIPPED | OUTPATIENT
Start: 2022-09-15 | End: 2022-09-26 | Stop reason: SDUPTHER

## 2022-09-26 ENCOUNTER — OFFICE VISIT (OUTPATIENT)
Dept: INTERNAL MEDICINE | Facility: CLINIC | Age: 47
End: 2022-09-26

## 2022-09-26 ENCOUNTER — PRIOR AUTHORIZATION (OUTPATIENT)
Dept: INTERNAL MEDICINE | Facility: CLINIC | Age: 47
End: 2022-09-26

## 2022-09-26 VITALS
HEIGHT: 76 IN | HEART RATE: 69 BPM | BODY MASS INDEX: 38.36 KG/M2 | DIASTOLIC BLOOD PRESSURE: 82 MMHG | WEIGHT: 315 LBS | SYSTOLIC BLOOD PRESSURE: 130 MMHG | TEMPERATURE: 98.2 F | OXYGEN SATURATION: 99 %

## 2022-09-26 DIAGNOSIS — E66.09 CLASS 2 OBESITY DUE TO EXCESS CALORIES WITHOUT SERIOUS COMORBIDITY WITH BODY MASS INDEX (BMI) OF 38.0 TO 38.9 IN ADULT: ICD-10-CM

## 2022-09-26 DIAGNOSIS — F41.8 DEPRESSION WITH ANXIETY: Primary | ICD-10-CM

## 2022-09-26 DIAGNOSIS — Z23 NEED FOR VACCINATION: ICD-10-CM

## 2022-09-26 PROCEDURE — 90686 IIV4 VACC NO PRSV 0.5 ML IM: CPT | Performed by: NURSE PRACTITIONER

## 2022-09-26 PROCEDURE — 99214 OFFICE O/P EST MOD 30 MIN: CPT | Performed by: NURSE PRACTITIONER

## 2022-09-26 PROCEDURE — 90471 IMMUNIZATION ADMIN: CPT | Performed by: NURSE PRACTITIONER

## 2022-09-26 RX ORDER — BUPROPION HYDROCHLORIDE 150 MG/1
150 TABLET ORAL DAILY
Qty: 90 TABLET | Refills: 1 | Status: SHIPPED | OUTPATIENT
Start: 2022-09-26 | End: 2022-12-13

## 2022-09-26 RX ORDER — TESTOSTERONE 75 MG/1
PELLET SUBCUTANEOUS
COMMUNITY
Start: 2022-08-10

## 2022-09-26 NOTE — PROGRESS NOTES
"Chief Complaint   Patient presents with   • Follow-up     Discuss meds, interested in Wegovy     Subjective   Cooper Luu is a 47 y.o. male.     History of Present Illness     Patient presents for follow-up.  Wellbutrin was effective.  He was taking it daily and was having significantly improved mood. He stopped taking it because he felt good and thought he didn't need it anymore, but then started feeling bad again. He restarted the medicine and now he is back to being on the right path again. Would like a refill.  Patient is interested in weight loss options.  For the past year he has been on a weight loss program.  This program includes intermittent fasting along with calorie counting and being in a caloric deficit.  He goes to the gym 3+ times a week, gets over 150 minutes/week of exercise, and eats a very healthy diet low in carbs and high in protein.  He has gained 15 pounds since last visit on 6/30/2022 unintentionally.  Patient's BMI is 38.59 which is considered class II obesity.  He would be eligible for a medication weight loss option.  No personal or family history of medullary thyroid cancer. No significant GI history.    The following portions of the patient's history were reviewed and updated as appropriate: allergies, current medications, past family history, past medical history, past social history, past surgical history and problem list.    Review of Systems   Constitutional: Positive for unexpected weight change (gain).   Psychiatric/Behavioral: Positive for dysphoric mood. The patient is nervous/anxious.    All other systems reviewed and are negative.      Objective   /82   Pulse 69   Temp 98.2 °F (36.8 °C) (Temporal)   Ht 193 cm (76\")   Wt (!) 144 kg (317 lb)   SpO2 99%   BMI 38.59 kg/m²   Body mass index is 38.59 kg/m².  Physical Exam  Vitals and nursing note reviewed.   Constitutional:       General: He is not in acute distress.     Appearance: Normal appearance. He is obese. "   HENT:      Head: Normocephalic and atraumatic.   Eyes:      Extraocular Movements: Extraocular movements intact.   Neck:      Thyroid: No thyroid mass, thyromegaly or thyroid tenderness.   Cardiovascular:      Rate and Rhythm: Normal rate and regular rhythm.   Pulmonary:      Effort: Pulmonary effort is normal.      Breath sounds: Normal breath sounds.   Musculoskeletal:         General: Normal range of motion.      Cervical back: Normal range of motion.   Neurological:      General: No focal deficit present.      Mental Status: He is alert and oriented to person, place, and time.   Psychiatric:         Mood and Affect: Mood normal.         Behavior: Behavior normal.         Thought Content: Thought content normal.         Judgment: Judgment normal.       Assessment & Plan   Cooper Luu is here today and the following problems have been addressed:      Diagnoses and all orders for this visit:    1. Depression with anxiety (Primary)  -     buPROPion XL (Wellbutrin XL) 150 MG 24 hr tablet; Take 1 tablet by mouth Daily.  Dispense: 90 tablet; Refill: 1  - Restart Wellbutrin 150 mg daily. Take same time every day, don't miss any doses. Controlled on this med.     2. Class 2 obesity due to excess calories without serious comorbidity with body mass index (BMI) of 38.0 to 38.9 in adult  -     Liraglutide (SAXENDA) 18 MG/3ML injection pen; Inject 0.6mg under skin daily for week one, THEN 1.2mg daily for week two, THEN 1.8mg daily for week three, then 2.4mg daily for week four.  Indications: OBESITY, work out 3x week, dieting (fasting, calorie counting), tried wellbutrin  Dispense: 3 mL; Refill: 0  - Will do a trial of Saxenda. Notify if any issues picking up medication. This one is approved for obesity. He has no personal or family history of medullary thyroid cancer.  Upon exam he has no mass, nodule, tenderness, or enlargement of thyroid gland.  Discussed with patient that if he develops a lump or swelling in the  neck, to stop the medication and RTC as this is a risk of medication.  Patient verbalizes understanding, aware of the risks and would like to start the medication. Discussed the side effects of the medication, especially GI. Goal will be to lose 5% of body weight within the next 12 weeks.    3. Need for vaccination  -     FluLaval/Fluzone >6 mos ([id number])        Follow Up   Return if symptoms worsen or fail to improve.  Patient was given instructions and counseling regarding his condition or for health maintenance advice. Please see specific information pulled into the AVS if appropriate.     Juliette KING  [facility] [place]

## 2022-09-28 DIAGNOSIS — E66.09 CLASS 2 OBESITY DUE TO EXCESS CALORIES WITHOUT SERIOUS COMORBIDITY WITH BODY MASS INDEX (BMI) OF 38.0 TO 38.9 IN ADULT: Primary | ICD-10-CM

## 2022-10-10 RX ORDER — ORAL SEMAGLUTIDE 3 MG/1
3 TABLET ORAL DAILY
Qty: 30 TABLET | Refills: 0 | COMMUNITY
Start: 2022-10-10

## 2022-10-18 ENCOUNTER — TELEPHONE (OUTPATIENT)
Dept: INTERNAL MEDICINE | Facility: CLINIC | Age: 47
End: 2022-10-18

## 2022-12-13 ENCOUNTER — OFFICE VISIT (OUTPATIENT)
Dept: INTERNAL MEDICINE | Facility: CLINIC | Age: 47
End: 2022-12-13

## 2022-12-13 VITALS
BODY MASS INDEX: 38.36 KG/M2 | DIASTOLIC BLOOD PRESSURE: 72 MMHG | OXYGEN SATURATION: 98 % | TEMPERATURE: 98.6 F | HEIGHT: 76 IN | HEART RATE: 75 BPM | WEIGHT: 315 LBS | SYSTOLIC BLOOD PRESSURE: 128 MMHG

## 2022-12-13 DIAGNOSIS — L20.84 INTRINSIC ECZEMA: ICD-10-CM

## 2022-12-13 DIAGNOSIS — R79.89 LOW TESTOSTERONE: ICD-10-CM

## 2022-12-13 DIAGNOSIS — D75.1 POLYCYTHEMIA: ICD-10-CM

## 2022-12-13 DIAGNOSIS — R63.8 ABNORMAL CRAVING: ICD-10-CM

## 2022-12-13 DIAGNOSIS — F41.8 DEPRESSION WITH ANXIETY: ICD-10-CM

## 2022-12-13 DIAGNOSIS — Z00.00 ANNUAL PHYSICAL EXAM: Primary | ICD-10-CM

## 2022-12-13 DIAGNOSIS — E66.01 CLASS 3 SEVERE OBESITY DUE TO EXCESS CALORIES WITHOUT SERIOUS COMORBIDITY WITH BODY MASS INDEX (BMI) OF 40.0 TO 44.9 IN ADULT: ICD-10-CM

## 2022-12-13 DIAGNOSIS — R63.5 WEIGHT GAIN: ICD-10-CM

## 2022-12-13 DIAGNOSIS — E78.2 MODERATE MIXED HYPERLIPIDEMIA NOT REQUIRING STATIN THERAPY: ICD-10-CM

## 2022-12-13 PROCEDURE — 99396 PREV VISIT EST AGE 40-64: CPT | Performed by: NURSE PRACTITIONER

## 2022-12-13 RX ORDER — TESTOSTERONE 20.25 MG/1.25G
GEL TOPICAL
COMMUNITY
Start: 2022-12-04

## 2022-12-13 RX ORDER — CLOBETASOL PROPIONATE 0.5 MG/G
1 OINTMENT TOPICAL 2 TIMES DAILY PRN
Qty: 60 G | Refills: 5 | Status: SHIPPED | OUTPATIENT
Start: 2022-12-13

## 2022-12-13 RX ORDER — NALTREXONE HYDROCHLORIDE 50 MG/1
TABLET, FILM COATED ORAL
Qty: 90 TABLET | Refills: 1 | Status: SHIPPED | OUTPATIENT
Start: 2022-12-13

## 2022-12-13 RX ORDER — BUPROPION HYDROCHLORIDE 300 MG/1
300 TABLET ORAL DAILY
Qty: 90 TABLET | Refills: 1 | Status: SHIPPED | OUTPATIENT
Start: 2022-12-13

## 2022-12-13 NOTE — PROGRESS NOTES
Subjective   Cooper Luu is a 47 y.o. male and is here for a comprehensive physical exam.   He is due labs.  UTD on colon cancer screening.  Due COVID-19 booster, declines.   Has been having weight gain, low libido and bad cravings. Initially thought it could be related to Wellbutrin.  He had testosterone implant and once returning to the urologist found out it was not effective.   His testosterone dropped to <200. Urologist put him back on gel and he is feeling better.   More energy and less cravings.  Asking to increase his Wellbutrin to 300 mg.   Asking for stronger cream for eczema, primarily on the right hand.      HPI:      Health Habits:  Eye exam within last 2 years? Yes  Dental exam every 6 months? Yes   Exercise habits: 4 days a week typically, weight lifting  Healthy diet? Intermittent fasting, balanced diet   Caffeine: a pot of coffee daily   Alcohol: Rarely.   Nicotine: Former, quit 3 weeks ago, had previously quit x 5 months and relapsed due to stress   The 10-year ASCVD risk score (Joni PLAZA, et al., 2019) is: 2.2%    Values used to calculate the score:      Age: 47 years      Sex: Male      Is Non- : No      Diabetic: No      Tobacco smoker: No      Systolic Blood Pressure: 128 mmHg      Is BP treated: No      HDL Cholesterol: 38 mg/dL      Total Cholesterol: 170 mg/dL  Do you take any herbs or supplements that were not prescribed by a doctor? MV, mag, fish oil, zinc, VitD, VItC       History:  He reports No decrease in urinary stream,  No nocturia, No dribbling, No hesitancy.    Family history of prostate/colon cancer: no, unknown hx father   Sexually active: Dating at the moment but does not have any active female partners at this time - does not always use protection  Declines STI testing.    The following portions of the patient's history were reviewed and updated as appropriate: He  has a past medical history of Anxiety, Arthritis, Bell's palsy, Cystic acne,  Eczema, History of fracture, History of UTI (1997), Hypertension, Infection, skin, staph, Injury of back, Low testosterone, Sinus problem, Sleep apnea (02/10/2021), Tattoos, Wears contact lenses, Wears glasses, Wears glasses, and Wears partial dentures.  He does not have any pertinent problems on file.  He  has a past surgical history that includes Cervical discectomy; Cholecystectomy; Fracture surgery (Left); Back surgery; Tonsillectomy; cystoscopy urethrotomy visual internal (N/A, 10/7/2019); [place] tooth extraction; and Carpal tunnel release (Left, 2/12/2021).  His family history includes Cancer in his mother; Diabetes in his father and mother; Heart attack in his father.  He  reports that he quit smoking about 6 months ago. His smoking use included electronic cigarette, cigarettes, and cigars. He started smoking about 25 years ago. He has never used smokeless tobacco. He reports current alcohol use of about 2.0 standard drinks per week. He reports that he does not use drugs.  Current Outpatient Medications   Medication Sig Dispense Refill   • buPROPion XL (Wellbutrin XL) 300 MG 24 hr tablet Take 1 tablet by mouth Daily. 90 tablet 1   • MAGNESIUM PO Take  by mouth.     • multivitamin with minerals tablet tablet Take 1 tablet by mouth Daily.     • Omega-3 Fatty Acids (FISH OIL PO) Take  by mouth.     • tadalafil (CIALIS) 5 MG tablet Take 1 tablet by mouth Daily. 90 tablet 3   • Testopel 75 MG implant pellet      • Testosterone 1.62 % gel APPLY 4 PUMPS TOPICALLY TO SKIN OF EITHER SHOULDER ONCE DAILY IN THE MORNING     • triamcinolone (KENALOG) 0.1 % ointment Apply a thin layer to effected areas twice daily during eczema flare ups.  Use for not more than 10 days at a time. Avoid face and groin. 80 g 2   • vitamin C (ASCORBIC ACID) 250 MG tablet Take 1,000 mg by mouth Daily.     • VITAMIN D, CHOLECALCIFEROL, PO Take 2,000 Units by mouth Daily.     • Zinc 50 MG tablet Take 1 tablet by mouth Daily.     • clobetasol  "(TEMOVATE) 0.05 % ointment Apply 1 application topically to the appropriate area as directed 2 (Two) Times a Day As Needed (eczema). 60 g 5   • Liraglutide (SAXENDA) 18 MG/3ML injection pen Inject 0.6mg under skin daily for week one, THEN 1.2mg daily for week two, THEN 1.8mg daily for week three, then 2.4mg daily for week four.  Indications: OBESITY, work out 3x week, dieting (fasting, calorie counting), tried wellbutrin 3 mL 0   • naltrexone (DEPADE) 50 MG tablet Take 1/2 tablet twice a day by mouth 90 tablet 1   • Semaglutide (Rybelsus) 3 MG tablet Take 1 tablet by mouth Daily. 30 tablet 0     No current facility-administered medications for this visit.       Review of Systems    Review of Systems   Constitutional: Positive for appetite change, fatigue and unexpected weight gain.   Respiratory: Negative.    Cardiovascular: Negative.    Gastrointestinal: Negative.    Musculoskeletal: Negative.    Skin: Positive for rash.   Neurological: Negative.    All other systems reviewed and are negative.        Objective   /72   Pulse 75   Temp 98.6 °F (37 °C) (Temporal)   Ht 193 cm (76\")   Wt (!) 151 kg (333 lb)   SpO2 98%   BMI 40.53 kg/m²     Physical Exam  Vitals and nursing note reviewed.   Constitutional:       General: He is not in acute distress.     Appearance: Normal appearance. He is obese. He is not diaphoretic.   HENT:      Head: Normocephalic and atraumatic.      Right Ear: Tympanic membrane, ear canal and external ear normal.      Left Ear: Tympanic membrane, ear canal and external ear normal.      Nose: Nose normal.      Mouth/Throat:      Mouth: Mucous membranes are moist.      Pharynx: Oropharynx is clear.   Eyes:      General: No scleral icterus.     Extraocular Movements: Extraocular movements intact.      Conjunctiva/sclera: Conjunctivae normal.      Pupils: Pupils are equal, round, and reactive to light.   Neck:      Thyroid: No thyromegaly.   Cardiovascular:      Rate and Rhythm: Normal rate " and regular rhythm.      Pulses: Normal pulses.   Pulmonary:      Effort: Pulmonary effort is normal.      Breath sounds: Normal breath sounds.   Abdominal:      General: Abdomen is flat. Bowel sounds are normal.      Palpations: Abdomen is soft.      Tenderness: There is no abdominal tenderness.   Musculoskeletal:         General: Normal range of motion.      Cervical back: Normal range of motion and neck supple.   Lymphadenopathy:      Cervical: No cervical adenopathy.   Skin:     General: Skin is warm and dry.      Coloration: Skin is not jaundiced or pale.      Findings: Rash (back of right hand, mild on back of left hand) present. Rash is crusting and scaling.   Neurological:      Mental Status: He is alert and oriented to person, place, and time.      Cranial Nerves: No cranial nerve deficit.      Motor: No weakness.      Coordination: Coordination normal.      Gait: Gait normal.   Psychiatric:         Mood and Affect: Mood normal.         Behavior: Behavior normal.         Health Maintenance   Topic Date Due   • LIPID PANEL  11/02/2022   • TDAP/TD VACCINES (2 - Td or Tdap) 12/03/2029   • INFLUENZA VACCINE  Completed        Assessment & Plan   Healthy male exam.     1.    Diagnosis Plan   1. Annual physical exam  CBC (No Diff)    Comprehensive Metabolic Panel    Lipid Panel    TSH Rfx On Abnormal To Free T4    Hemoglobin A1c  Patient is up-to-date on his screenings.  He is fasting today and will complete labs.      2. Intrinsic eczema  clobetasol (TEMOVATE) 0.05 % ointment  Clobetasol prescribed.  Stop triamcinolone.  Stay hydrated avoid smoking.  Can refer to dermatology if not beneficial.      3. Moderate mixed hyperlipidemia not requiring statin therapy  Lipid Panel  Low-cholesterol diet and exercise regimen      4. Weight gain  Comprehensive Metabolic Panel    TSH Rfx On Abnormal To Free T4    Hemoglobin A1c  150 minutes of physical activity weekly and healthy diet recommended.  Follow urology to monitor  testosterone as fluctuations can sometimes cause weight gain.  Increase Wellbutrin and will add naltrexone for cravings.      5. Abnormal craving  naltrexone (DEPADE) 50 MG tablet  Will try naltrexone for food cravings, take half a tablet twice a day      6. Low testosterone  Continue to follow urology      7. Polycythemia  CBC (No Diff)      8. Depression with anxiety  buPROPion XL (Wellbutrin XL) 300 MG 24 hr tablet  Mood is stable per patient.  Increased Wellbutrin to 300 mg daily per request.       9. Class 3 severe obesity due to excess calories without serious comorbidity with body mass index (BMI) of 40.0 to 44.9 in adult ([facility])  Comprehensive Metabolic Panel    Lipid Panel    TSH Rfx On Abnormal To Free T4    Hemoglobin A1c    buPROPion XL (Wellbutrin XL) 300 MG 24 hr tablet    naltrexone (DEPADE) 50 MG tablet  Will do a trial of Wellbutrin in addition to naltrexone.  Take the naltrexone half a tablet twice a day.  Follow-up in 3 months.  Should lose 5% of body weight in the first 12 weeks.  Class 3 Severe Obesity (BMI >=40). Obesity-related health conditions include the following: hypertension, dyslipidemias and GERD. Obesity is worsening. BMI is is above average; BMI management plan is completed. We discussed low calorie, low carb based diet program, portion control, increasing exercise, joining a fitness center or start home based exercise program, management of depression/anxiety/stress to control compensatory eating and pharmacologic options including wellbutrin/naltrexone.          2. Patient Counseling:  -Health maintenance information provided and discussed  -Counseled on age-appropriate health screenings  -Immunizations for age discussed, encouraged.  -Encouraged 30 minutes of exercise 5 days a week, or 150 minutes total per week.  -Recommend healthy diet choices and portion control   -Discussed importance of regular dental visits and cleanings every 6 months.  -Discussed importance of regular eye  exams    3. Discussed the patient's BMI with him.  The BMI is above average; BMI management plan is completed  4. Return in about 3 months (around 3/13/2023) for Weight .         Juliette Reece, APRN  12/13/2022  21:52 EST

## 2022-12-14 LAB
ALBUMIN SERPL-MCNC: 4.8 G/DL (ref 3.5–5.2)
ALBUMIN/GLOB SERPL: 1.8 G/DL
ALP SERPL-CCNC: 74 U/L (ref 39–117)
ALT SERPL-CCNC: 37 U/L (ref 1–41)
AST SERPL-CCNC: 30 U/L (ref 1–40)
BILIRUB SERPL-MCNC: 0.4 MG/DL (ref 0–1.2)
BUN SERPL-MCNC: 16 MG/DL (ref 6–20)
BUN/CREAT SERPL: 15.4 (ref 7–25)
CALCIUM SERPL-MCNC: 9.7 MG/DL (ref 8.6–10.5)
CHLORIDE SERPL-SCNC: 101 MMOL/L (ref 98–107)
CHOLEST SERPL-MCNC: 210 MG/DL (ref 0–200)
CO2 SERPL-SCNC: 27.7 MMOL/L (ref 22–29)
CREAT SERPL-MCNC: 1.04 MG/DL (ref 0.76–1.27)
EGFRCR SERPLBLD CKD-EPI 2021: 90.2 ML/MIN/1.73
ERYTHROCYTE [DISTWIDTH] IN BLOOD BY AUTOMATED COUNT: 12.8 % (ref 12.3–15.4)
GLOBULIN SER CALC-MCNC: 2.7 GM/DL
GLUCOSE SERPL-MCNC: 102 MG/DL (ref 65–99)
HBA1C MFR BLD: 5.7 % (ref 4.8–5.6)
HCT VFR BLD AUTO: 45.2 % (ref 37.5–51)
HDLC SERPL-MCNC: 34 MG/DL (ref 40–60)
HGB BLD-MCNC: 15.3 G/DL (ref 13–17.7)
LDLC SERPL CALC-MCNC: 128 MG/DL (ref 0–100)
MCH RBC QN AUTO: 30.1 PG (ref 26.6–33)
MCHC RBC AUTO-ENTMCNC: 33.8 G/DL (ref 31.5–35.7)
MCV RBC AUTO: 89 FL (ref 79–97)
PLATELET # BLD AUTO: 247 10*3/MM3 (ref 140–450)
POTASSIUM SERPL-SCNC: 4.5 MMOL/L (ref 3.5–5.2)
PROT SERPL-MCNC: 7.5 G/DL (ref 6–8.5)
RBC # BLD AUTO: 5.08 10*6/MM3 (ref 4.14–5.8)
SODIUM SERPL-SCNC: 137 MMOL/L (ref 136–145)
TRIGL SERPL-MCNC: 268 MG/DL (ref 0–150)
TSH SERPL DL<=0.005 MIU/L-ACNC: 2.15 UIU/ML (ref 0.27–4.2)
VLDLC SERPL CALC-MCNC: 48 MG/DL (ref 5–40)
WBC # BLD AUTO: 8.84 10*3/MM3 (ref 3.4–10.8)

## 2025-03-03 NOTE — PROGRESS NOTES
Low cholesterol, avoid sugary foods/drinks, high protein, low carb / 150+ mins exercise weekly to lower cholesterol / triglycerides / A1c levels. Prediabetic range at 5.7% Never smoker

## (undated) DEVICE — PREP SOL DYNA-HEX CHG LIQ 4% BT 4OZ

## (undated) DEVICE — TBG FLUID WARM ST SNGL

## (undated) DEVICE — SUT ETHLN 4/0 PS2 PLSTC 1667G

## (undated) DEVICE — DISPOSABLE TOURNIQUET CUFF SINGLE BLADDER, SINGLE PORT AND QUICK CONNECT CONNECTOR: Brand: COLOR CUFF

## (undated) DEVICE — CUFF SCD HEMOFORCE SEQ CALF STD MD

## (undated) DEVICE — RICH CYSTO: Brand: MEDLINE INDUSTRIES, INC.

## (undated) DEVICE — SOL IRR NACL 0.9PCT 3000ML

## (undated) DEVICE — GLV SURG SENSICARE W/ALOE PF LF 7.5 STRL

## (undated) DEVICE — DRSNG WND GZ CURAD OIL EMULSION 3X3IN STRL

## (undated) DEVICE — KNIFE,HOOK,STERILE: Brand: N.A.

## (undated) DEVICE — PK EXTRM UPPR 20

## (undated) DEVICE — BNDG ELAS MATRX V/CLS 3INX5YD LF

## (undated) DEVICE — CATH URETRL AP 18F

## (undated) DEVICE — BNDG GZ SOF-FORM CONFRM 2X75IN LF STRL

## (undated) DEVICE — GLV SURG SENSICARE W/ALOE PF LF 8 STRL

## (undated) DEVICE — CATH FOL COUNCL 2WY 18F 5CC